# Patient Record
Sex: FEMALE | Race: WHITE | NOT HISPANIC OR LATINO | Employment: OTHER | URBAN - METROPOLITAN AREA
[De-identification: names, ages, dates, MRNs, and addresses within clinical notes are randomized per-mention and may not be internally consistent; named-entity substitution may affect disease eponyms.]

---

## 2023-06-14 ENCOUNTER — HOSPITAL ENCOUNTER (EMERGENCY)
Facility: HOSPITAL | Age: 43
Discharge: HOME | End: 2023-06-14
Attending: EMERGENCY MEDICINE | Admitting: EMERGENCY MEDICINE
Payer: MEDICARE

## 2023-06-14 ENCOUNTER — APPOINTMENT (OUTPATIENT)
Dept: CT IMAGING | Facility: HOSPITAL | Age: 43
End: 2023-06-14
Payer: MEDICARE

## 2023-06-14 VITALS
WEIGHT: 165 LBS | HEART RATE: 60 BPM | DIASTOLIC BLOOD PRESSURE: 74 MMHG | SYSTOLIC BLOOD PRESSURE: 137 MMHG | OXYGEN SATURATION: 98 % | TEMPERATURE: 98.3 F | BODY MASS INDEX: 30.36 KG/M2 | RESPIRATION RATE: 20 BRPM | HEIGHT: 62 IN

## 2023-06-14 DIAGNOSIS — N39.0 ACUTE URINARY TRACT INFECTION: ICD-10-CM

## 2023-06-14 DIAGNOSIS — K92.1 BLACK STOOL: ICD-10-CM

## 2023-06-14 DIAGNOSIS — R10.9 ACUTE ABDOMINAL PAIN: Primary | ICD-10-CM

## 2023-06-14 LAB
ABO GROUP BLD: NORMAL
ABO GROUP BLD: NORMAL
ALBUMIN SERPL-MCNC: 4.1 G/DL (ref 3.5–5.2)
ALBUMIN/GLOB SERPL: 1.4 G/DL
ALP SERPL-CCNC: 102 U/L (ref 39–117)
ALT SERPL W P-5'-P-CCNC: 18 U/L (ref 1–33)
ANION GAP SERPL CALCULATED.3IONS-SCNC: 8 MMOL/L (ref 5–15)
AST SERPL-CCNC: 21 U/L (ref 1–32)
B-HCG UR QL: NEGATIVE
BACTERIA UR QL AUTO: ABNORMAL /HPF
BASOPHILS # BLD AUTO: 0.03 10*3/MM3 (ref 0–0.2)
BASOPHILS NFR BLD AUTO: 0.4 % (ref 0–1.5)
BILIRUB SERPL-MCNC: 0.3 MG/DL (ref 0–1.2)
BILIRUB UR QL STRIP: NEGATIVE
BLD GP AB SCN SERPL QL: NEGATIVE
BUN SERPL-MCNC: 13 MG/DL (ref 6–20)
BUN/CREAT SERPL: 23.6 (ref 7–25)
CALCIUM SPEC-SCNC: 9 MG/DL (ref 8.6–10.5)
CHLORIDE SERPL-SCNC: 108 MMOL/L (ref 98–107)
CLARITY UR: ABNORMAL
CO2 SERPL-SCNC: 25 MMOL/L (ref 22–29)
COLOR UR: YELLOW
CREAT SERPL-MCNC: 0.55 MG/DL (ref 0.57–1)
DEPRECATED RDW RBC AUTO: 36.7 FL (ref 37–54)
DEVELOPER EXPIRATION DATE: NORMAL
DEVELOPER LOT NUMBER: NORMAL
EGFRCR SERPLBLD CKD-EPI 2021: 115.4 ML/MIN/1.73
EOSINOPHIL # BLD AUTO: 0.13 10*3/MM3 (ref 0–0.4)
EOSINOPHIL NFR BLD AUTO: 1.7 % (ref 0.3–6.2)
ERYTHROCYTE [DISTWIDTH] IN BLOOD BY AUTOMATED COUNT: 12.1 % (ref 12.3–15.4)
EXPIRATION DATE: NORMAL
EXPIRATION DATE: NORMAL
FECAL OCCULT BLOOD SCREEN, POC: NEGATIVE
GLOBULIN UR ELPH-MCNC: 2.9 GM/DL
GLUCOSE SERPL-MCNC: 93 MG/DL (ref 65–99)
GLUCOSE UR STRIP-MCNC: NEGATIVE MG/DL
HCT VFR BLD AUTO: 37.6 % (ref 34–46.6)
HGB BLD-MCNC: 11.9 G/DL (ref 12–15.9)
HGB UR QL STRIP.AUTO: ABNORMAL
HOLD SPECIMEN: NORMAL
HYALINE CASTS UR QL AUTO: ABNORMAL /LPF
IMM GRANULOCYTES # BLD AUTO: 0.02 10*3/MM3 (ref 0–0.05)
IMM GRANULOCYTES NFR BLD AUTO: 0.3 % (ref 0–0.5)
INTERNAL NEGATIVE CONTROL: NEGATIVE
INTERNAL POSITIVE CONTROL: POSITIVE
KETONES UR QL STRIP: ABNORMAL
LEUKOCYTE ESTERASE UR QL STRIP.AUTO: NEGATIVE
LYMPHOCYTES # BLD AUTO: 2.22 10*3/MM3 (ref 0.7–3.1)
LYMPHOCYTES NFR BLD AUTO: 29.6 % (ref 19.6–45.3)
Lab: NORMAL
Lab: NORMAL
MCH RBC QN AUTO: 26.2 PG (ref 26.6–33)
MCHC RBC AUTO-ENTMCNC: 31.6 G/DL (ref 31.5–35.7)
MCV RBC AUTO: 82.8 FL (ref 79–97)
MONOCYTES # BLD AUTO: 0.39 10*3/MM3 (ref 0.1–0.9)
MONOCYTES NFR BLD AUTO: 5.2 % (ref 5–12)
MUCOUS THREADS URNS QL MICRO: ABNORMAL /HPF
NEGATIVE CONTROL: NEGATIVE
NEUTROPHILS NFR BLD AUTO: 4.72 10*3/MM3 (ref 1.7–7)
NEUTROPHILS NFR BLD AUTO: 62.8 % (ref 42.7–76)
NITRITE UR QL STRIP: NEGATIVE
NRBC BLD AUTO-RTO: 0 /100 WBC (ref 0–0.2)
PH UR STRIP.AUTO: <=5 [PH] (ref 5–8)
PLATELET # BLD AUTO: 322 10*3/MM3 (ref 140–450)
PMV BLD AUTO: 10 FL (ref 6–12)
POSITIVE CONTROL: POSITIVE
POTASSIUM SERPL-SCNC: 3.8 MMOL/L (ref 3.5–5.2)
PROT SERPL-MCNC: 7 G/DL (ref 6–8.5)
PROT UR QL STRIP: NEGATIVE
RBC # BLD AUTO: 4.54 10*6/MM3 (ref 3.77–5.28)
RBC # UR STRIP: ABNORMAL /HPF
REF LAB TEST METHOD: ABNORMAL
RH BLD: POSITIVE
RH BLD: POSITIVE
SODIUM SERPL-SCNC: 141 MMOL/L (ref 136–145)
SP GR UR STRIP: 1.03 (ref 1–1.03)
SQUAMOUS #/AREA URNS HPF: ABNORMAL /HPF
T&S EXPIRATION DATE: NORMAL
UROBILINOGEN UR QL STRIP: ABNORMAL
WBC # UR STRIP: ABNORMAL /HPF
WBC NRBC COR # BLD: 7.51 10*3/MM3 (ref 3.4–10.8)
WHOLE BLOOD HOLD COAG: NORMAL
WHOLE BLOOD HOLD SPECIMEN: NORMAL

## 2023-06-14 PROCEDURE — 86901 BLOOD TYPING SEROLOGIC RH(D): CPT

## 2023-06-14 PROCEDURE — 86850 RBC ANTIBODY SCREEN: CPT | Performed by: EMERGENCY MEDICINE

## 2023-06-14 PROCEDURE — 87186 SC STD MICRODIL/AGAR DIL: CPT | Performed by: EMERGENCY MEDICINE

## 2023-06-14 PROCEDURE — 80053 COMPREHEN METABOLIC PANEL: CPT | Performed by: EMERGENCY MEDICINE

## 2023-06-14 PROCEDURE — 85025 COMPLETE CBC W/AUTO DIFF WBC: CPT | Performed by: EMERGENCY MEDICINE

## 2023-06-14 PROCEDURE — 99283 EMERGENCY DEPT VISIT LOW MDM: CPT

## 2023-06-14 PROCEDURE — 86900 BLOOD TYPING SEROLOGIC ABO: CPT

## 2023-06-14 PROCEDURE — 86901 BLOOD TYPING SEROLOGIC RH(D): CPT | Performed by: EMERGENCY MEDICINE

## 2023-06-14 PROCEDURE — 82270 OCCULT BLOOD FECES: CPT | Performed by: EMERGENCY MEDICINE

## 2023-06-14 PROCEDURE — 86900 BLOOD TYPING SEROLOGIC ABO: CPT | Performed by: EMERGENCY MEDICINE

## 2023-06-14 PROCEDURE — 81001 URINALYSIS AUTO W/SCOPE: CPT | Performed by: EMERGENCY MEDICINE

## 2023-06-14 PROCEDURE — 81025 URINE PREGNANCY TEST: CPT | Performed by: EMERGENCY MEDICINE

## 2023-06-14 PROCEDURE — 74176 CT ABD & PELVIS W/O CONTRAST: CPT

## 2023-06-14 PROCEDURE — 87077 CULTURE AEROBIC IDENTIFY: CPT | Performed by: EMERGENCY MEDICINE

## 2023-06-14 PROCEDURE — 87086 URINE CULTURE/COLONY COUNT: CPT | Performed by: EMERGENCY MEDICINE

## 2023-06-14 RX ORDER — CEFDINIR 300 MG/1
300 CAPSULE ORAL 2 TIMES DAILY
Qty: 14 CAPSULE | Refills: 0 | Status: SHIPPED | OUTPATIENT
Start: 2023-06-14 | End: 2023-06-15 | Stop reason: SDUPTHER

## 2023-06-14 RX ORDER — SODIUM CHLORIDE 0.9 % (FLUSH) 0.9 %
10 SYRINGE (ML) INJECTION AS NEEDED
Status: DISCONTINUED | OUTPATIENT
Start: 2023-06-14 | End: 2023-06-14 | Stop reason: HOSPADM

## 2023-06-14 RX ORDER — TRAZODONE HYDROCHLORIDE 100 MG/1
100 TABLET ORAL
COMMUNITY
Start: 2023-05-19

## 2023-06-14 RX ORDER — CEFDINIR 300 MG/1
300 CAPSULE ORAL ONCE
Status: COMPLETED | OUTPATIENT
Start: 2023-06-14 | End: 2023-06-14

## 2023-06-14 RX ORDER — TIZANIDINE 2 MG/1
2 TABLET ORAL EVERY 12 HOURS PRN
COMMUNITY
Start: 2023-06-06

## 2023-06-14 RX ORDER — FLUTICASONE PROPIONATE AND SALMETEROL 50; 250 UG/1; UG/1
1 POWDER RESPIRATORY (INHALATION)
COMMUNITY
Start: 2023-05-19

## 2023-06-14 RX ORDER — CETIRIZINE HYDROCHLORIDE 10 MG/1
10 TABLET ORAL DAILY
COMMUNITY
Start: 2023-05-19

## 2023-06-14 RX ORDER — OMEPRAZOLE 40 MG/1
40 CAPSULE, DELAYED RELEASE ORAL DAILY
Qty: 30 CAPSULE | Refills: 0 | Status: SHIPPED | OUTPATIENT
Start: 2023-06-14 | End: 2023-06-15 | Stop reason: SDUPTHER

## 2023-06-14 RX ORDER — MULTIPLE VITAMINS W/ MINERALS TAB 9MG-400MCG
1 TAB ORAL DAILY
COMMUNITY

## 2023-06-14 RX ORDER — IBUPROFEN 400 MG/1
400 TABLET ORAL EVERY 6 HOURS PRN
COMMUNITY
Start: 2023-03-09

## 2023-06-14 RX ORDER — AZELASTINE 1 MG/ML
2 SPRAY, METERED NASAL 2 TIMES DAILY
COMMUNITY
Start: 2023-05-19

## 2023-06-14 RX ADMIN — CEFDINIR 300 MG: 300 CAPSULE ORAL at 21:22

## 2023-06-14 NOTE — Clinical Note
[facility] EMERGENCY DEPARTMENT  [address] [facility] [address]  Phone: [phone]    Mallorie Dover was seen and treated in our emergency department on 6/14/2023.  She may return to work on 06/16/2023.         Thank you for choosing [facility].    Marley Ramirez MD

## 2023-06-14 NOTE — ED PROVIDER NOTES
Subjective   History of Present Illness  43-year-old female who presents for evaluation of abdominal pain with associated black stools and right flank pain.  The patient reports that she has noticed pain throughout her anterior lower abdomen constant now for the last 3 weeks.  She describes it as crampy in nature.  She also reports some right flank pain.  She denies dysuria but does report some frequency.  She has been taking some Azo without relief of the symptoms.  Previous abdominal surgeries include gastric bypass in the distant past.  She has continued to pass gas.  She denies chest pain, cough, shortness of breath.  She denies fever or other infectious symptoms.  She denies any sick contacts.  No new medications.  She does take pain medication on a regular basis and does not desire any pain medication at this time.    Review of Systems   Constitutional:  Negative for chills, fatigue and fever.   HENT:  Negative for congestion, ear pain, postnasal drip, sinus pressure and sore throat.    Eyes:  Negative for pain, redness and visual disturbance.   Respiratory:  Negative for cough, chest tightness and shortness of breath.    Cardiovascular:  Negative for chest pain, palpitations and leg swelling.   Gastrointestinal:  Positive for abdominal pain and blood in stool. Negative for anal bleeding, diarrhea, nausea and vomiting.   Endocrine: Negative for polydipsia and polyuria.   Genitourinary:  Positive for flank pain and frequency. Negative for difficulty urinating, dysuria and urgency.   Musculoskeletal:  Negative for arthralgias, back pain and neck pain.   Skin:  Negative for pallor and rash.   Allergic/Immunologic: Negative for environmental allergies and immunocompromised state.   Neurological:  Negative for dizziness, weakness and headaches.   Hematological:  Negative for adenopathy.   Psychiatric/Behavioral:  Negative for confusion, self-injury and suicidal ideas. The patient is not nervous/anxious.    All  other systems reviewed and are negative.    History reviewed. No pertinent past medical history.    Allergies   Allergen Reactions   • Seroquel [Quetiapine] Swelling   • Gabapentin Rash       History reviewed. No pertinent surgical history.    History reviewed. No pertinent family history.    Social History     Socioeconomic History   • Marital status:            Objective   Physical Exam  Vitals and nursing note reviewed.   Constitutional:       General: She is not in acute distress.     Appearance: Normal appearance. She is well-developed. She is not toxic-appearing or diaphoretic.   HENT:      Head: Normocephalic and atraumatic.      Right Ear: External ear normal.      Left Ear: External ear normal.      Nose: Nose normal.   Eyes:      General: Lids are normal.      Pupils: Pupils are equal, round, and reactive to light.   Neck:      Trachea: No tracheal deviation.   Cardiovascular:      Rate and Rhythm: Normal rate and regular rhythm.      Pulses: No decreased pulses.      Heart sounds: Normal heart sounds. No murmur heard.    No friction rub. No gallop.   Pulmonary:      Effort: Pulmonary effort is normal. No respiratory distress.      Breath sounds: Normal breath sounds. No decreased breath sounds, wheezing, rhonchi or rales.   Abdominal:      General: Bowel sounds are normal.      Palpations: Abdomen is soft.      Tenderness: There is no abdominal tenderness in the suprapubic area. There is right CVA tenderness. There is no guarding or rebound.   Musculoskeletal:         General: No deformity. Normal range of motion.      Cervical back: Normal range of motion and neck supple.   Lymphadenopathy:      Cervical: No cervical adenopathy.   Skin:     General: Skin is warm and dry.      Findings: No rash.   Neurological:      Mental Status: She is alert and oriented to person, place, and time.      Cranial Nerves: No cranial nerve deficit.      Sensory: No sensory deficit.   Psychiatric:         Mood and  Affect: Mood is anxious.         Speech: Speech normal.         Behavior: Behavior normal.         Thought Content: Thought content normal.         Judgment: Judgment normal.       Procedures           ED Course                                           Medical Decision Making  Differential diagnosis includes diverticulitis, urinary tract infection, kidney stone, bowel spasm, bowel obstruction, constipation, dehydration, renal insufficiency.    Kidney function is normal and electrolytes are normal.    White blood cell count is normal.    Urine is negative for leuk esterase and nitrite but does show 4+ bacteria and also shows a significant number of squamous epithelial cells.  However the patient does complain of suprapubic abdominal pain that radiates to the flank and therefore treatment for urinary tract infection with a course of antibiotics is felt to be appropriate.    The patient's stool was negative for blood and she reports that she has been taking Pepto-Bismol which may be accounting for the black appearing stools.  The patient's H&H is normal.  The patient's vital signs have remained normal and stable and the patient is well-appearing nontoxic and in no acute distress.    CT scan of the abdomen pelvis shows no acute abnormalities.    Acute abdominal pain: acute illness or injury  Acute urinary tract infection: acute illness or injury  Black stool: acute illness or injury  Amount and/or Complexity of Data Reviewed  External Data Reviewed: labs, radiology, ECG and notes.  Labs: ordered. Decision-making details documented in ED Course.  Radiology: ordered and independent interpretation performed. Decision-making details documented in ED Course.      Risk  Prescription drug management.          Final diagnoses:   Acute abdominal pain   Acute urinary tract infection   Black stool       ED Disposition  ED Disposition     ED Disposition   Discharge    Condition   Stable    Comment   --             PATIENT  [facility] [address]  [phone]  Schedule an appointment as soon as possible for a visit            Medication List      New Prescriptions    cefdinir 300 MG capsule  Commonly known as: OMNICEF  Take 1 capsule by mouth 2 (Two) Times a Day for 7 days.     omeprazole 40 MG capsule  Commonly known as: priLOSEC  Take 1 capsule by mouth Daily for 30 days.           Where to Get Your Medications      You can get these medications from any pharmacy    Bring a paper prescription for each of these medications  · cefdinir 300 MG capsule  · omeprazole 40 MG capsule            Marley Ramirez MD  06/14/23 8401

## 2023-06-15 RX ORDER — OMEPRAZOLE 40 MG/1
40 CAPSULE, DELAYED RELEASE ORAL DAILY
Qty: 30 CAPSULE | Refills: 0 | Status: SHIPPED | OUTPATIENT
Start: 2023-06-15 | End: 2023-07-15

## 2023-06-15 RX ORDER — CEFDINIR 300 MG/1
300 CAPSULE ORAL 2 TIMES DAILY
Qty: 14 CAPSULE | Refills: 0 | Status: SHIPPED | OUTPATIENT
Start: 2023-06-15 | End: 2023-06-22

## 2023-06-15 NOTE — DISCHARGE INSTRUCTIONS
Make sure to drink plenty of fluids.    Take antibiotics as prescribed.    Take omeprazole as prescribed.    Follow-up with primary care physician for outpatient evaluation.

## 2023-06-16 LAB — BACTERIA SPEC AEROBE CULT: ABNORMAL

## 2023-11-20 ENCOUNTER — LAB (OUTPATIENT)
Dept: LAB | Facility: HOSPITAL | Age: 43
End: 2023-11-20
Payer: MEDICARE

## 2023-11-20 ENCOUNTER — OFFICE VISIT (OUTPATIENT)
Dept: FAMILY MEDICINE CLINIC | Facility: CLINIC | Age: 43
End: 2023-11-20
Payer: MEDICARE

## 2023-11-20 VITALS
WEIGHT: 178 LBS | SYSTOLIC BLOOD PRESSURE: 132 MMHG | OXYGEN SATURATION: 99 % | HEART RATE: 64 BPM | BODY MASS INDEX: 32.76 KG/M2 | HEIGHT: 62 IN | DIASTOLIC BLOOD PRESSURE: 72 MMHG

## 2023-11-20 DIAGNOSIS — C73: ICD-10-CM

## 2023-11-20 DIAGNOSIS — G56.03 BILATERAL CARPAL TUNNEL SYNDROME: ICD-10-CM

## 2023-11-20 DIAGNOSIS — D49.7 NEOPLASM OF THYROID: ICD-10-CM

## 2023-11-20 DIAGNOSIS — Z12.11 SCREENING FOR COLON CANCER: ICD-10-CM

## 2023-11-20 DIAGNOSIS — Z00.00 ENCOUNTER FOR ROUTINE HISTORY AND PHYSICAL EXAM IN FEMALE: ICD-10-CM

## 2023-11-20 DIAGNOSIS — Z86.39 HISTORY OF DIABETES MELLITUS: ICD-10-CM

## 2023-11-20 DIAGNOSIS — Z98.84 HISTORY OF GASTRIC BYPASS: ICD-10-CM

## 2023-11-20 DIAGNOSIS — F51.01 PRIMARY INSOMNIA: ICD-10-CM

## 2023-11-20 DIAGNOSIS — E55.9 VITAMIN D DEFICIENCY: ICD-10-CM

## 2023-11-20 DIAGNOSIS — I10 PRIMARY HYPERTENSION: Primary | ICD-10-CM

## 2023-11-20 DIAGNOSIS — Z11.59 ENCOUNTER FOR HEPATITIS C SCREENING TEST FOR LOW RISK PATIENT: ICD-10-CM

## 2023-11-20 DIAGNOSIS — R92.8 ABNORMAL FINDING ON MAMMOGRAPHY: ICD-10-CM

## 2023-11-20 DIAGNOSIS — E78.5 DYSLIPIDEMIA: ICD-10-CM

## 2023-11-20 DIAGNOSIS — Z12.31 ENCOUNTER FOR SCREENING MAMMOGRAM FOR MALIGNANT NEOPLASM OF BREAST: ICD-10-CM

## 2023-11-20 DIAGNOSIS — Z85.850 HISTORY OF THYROID CANCER: ICD-10-CM

## 2023-11-20 DIAGNOSIS — E66.01 MORBID OBESITY: ICD-10-CM

## 2023-11-20 DIAGNOSIS — E03.9 HYPOTHYROIDISM, UNSPECIFIED TYPE: ICD-10-CM

## 2023-11-20 DIAGNOSIS — I10 ESSENTIAL HYPERTENSION: ICD-10-CM

## 2023-11-20 DIAGNOSIS — J45.20 MILD INTERMITTENT ASTHMA WITHOUT COMPLICATION: ICD-10-CM

## 2023-11-20 DIAGNOSIS — D50.9 MICROCYTIC ANEMIA: ICD-10-CM

## 2023-11-20 PROBLEM — E04.1 THYROID NODULE: Status: ACTIVE | Noted: 2023-11-20

## 2023-11-20 PROBLEM — G47.00 INSOMNIA: Status: ACTIVE | Noted: 2023-11-20

## 2023-11-20 PROBLEM — F43.10 POSTTRAUMATIC STRESS DISORDER: Status: ACTIVE | Noted: 2022-03-01

## 2023-11-20 PROBLEM — R53.83 FATIGUE: Status: ACTIVE | Noted: 2022-03-02

## 2023-11-20 PROBLEM — E28.2 POLYCYSTIC OVARIES: Status: ACTIVE | Noted: 2023-11-20

## 2023-11-20 PROBLEM — E66.811 CLASS 1 OBESITY DUE TO EXCESS CALORIES WITH SERIOUS COMORBIDITY AND BODY MASS INDEX (BMI) OF 32.0 TO 32.9 IN ADULT: Status: ACTIVE | Noted: 2023-11-20

## 2023-11-20 PROBLEM — Z87.39 HISTORY OF BACK PAIN: Status: ACTIVE | Noted: 2022-03-02

## 2023-11-20 PROBLEM — R06.00 DYSPNEA: Status: ACTIVE | Noted: 2023-11-20

## 2023-11-20 PROBLEM — R12 HEARTBURN: Status: ACTIVE | Noted: 2021-01-25

## 2023-11-20 PROBLEM — E66.09 CLASS 1 OBESITY DUE TO EXCESS CALORIES WITH SERIOUS COMORBIDITY AND BODY MASS INDEX (BMI) OF 32.0 TO 32.9 IN ADULT: Status: ACTIVE | Noted: 2023-11-20

## 2023-11-20 PROBLEM — Z79.4 ENCOUNTER FOR LONG-TERM (CURRENT) USE OF INSULIN: Status: ACTIVE | Noted: 2022-03-10

## 2023-11-20 PROBLEM — F17.200 TOBACCO DEPENDENCE SYNDROME: Status: ACTIVE | Noted: 2023-11-20

## 2023-11-20 PROBLEM — G62.9 NEUROPATHY: Status: ACTIVE | Noted: 2023-11-20

## 2023-11-20 PROBLEM — Z86.59 HISTORY OF DEPRESSION: Status: ACTIVE | Noted: 2022-03-02

## 2023-11-20 PROCEDURE — 36415 COLL VENOUS BLD VENIPUNCTURE: CPT | Performed by: INTERNAL MEDICINE

## 2023-11-20 PROCEDURE — 82746 ASSAY OF FOLIC ACID SERUM: CPT | Performed by: INTERNAL MEDICINE

## 2023-11-20 PROCEDURE — 86803 HEPATITIS C AB TEST: CPT | Performed by: INTERNAL MEDICINE

## 2023-11-20 PROCEDURE — 84466 ASSAY OF TRANSFERRIN: CPT | Performed by: INTERNAL MEDICINE

## 2023-11-20 PROCEDURE — 82306 VITAMIN D 25 HYDROXY: CPT | Performed by: INTERNAL MEDICINE

## 2023-11-20 PROCEDURE — 83540 ASSAY OF IRON: CPT | Performed by: INTERNAL MEDICINE

## 2023-11-20 PROCEDURE — 85027 COMPLETE CBC AUTOMATED: CPT | Performed by: INTERNAL MEDICINE

## 2023-11-20 PROCEDURE — 84443 ASSAY THYROID STIM HORMONE: CPT | Performed by: INTERNAL MEDICINE

## 2023-11-20 PROCEDURE — 82043 UR ALBUMIN QUANTITATIVE: CPT | Performed by: INTERNAL MEDICINE

## 2023-11-20 PROCEDURE — 83036 HEMOGLOBIN GLYCOSYLATED A1C: CPT | Performed by: INTERNAL MEDICINE

## 2023-11-20 PROCEDURE — 82607 VITAMIN B-12: CPT | Performed by: INTERNAL MEDICINE

## 2023-11-20 PROCEDURE — 80053 COMPREHEN METABOLIC PANEL: CPT | Performed by: INTERNAL MEDICINE

## 2023-11-20 PROCEDURE — 80061 LIPID PANEL: CPT | Performed by: INTERNAL MEDICINE

## 2023-11-20 RX ORDER — TIZANIDINE 2 MG/1
2 TABLET ORAL EVERY 12 HOURS PRN
Qty: 60 TABLET | Refills: 2 | Status: SHIPPED | OUTPATIENT
Start: 2023-11-20

## 2023-11-20 RX ORDER — FLUTICASONE PROPIONATE AND SALMETEROL 50; 250 UG/1; UG/1
1 POWDER RESPIRATORY (INHALATION)
Qty: 1 EACH | Refills: 9 | Status: SHIPPED | OUTPATIENT
Start: 2023-11-20

## 2023-11-20 RX ORDER — TRAZODONE HYDROCHLORIDE 100 MG/1
100 TABLET ORAL NIGHTLY PRN
Qty: 30 TABLET | Refills: 2 | Status: SHIPPED | OUTPATIENT
Start: 2023-11-20

## 2023-11-20 RX ORDER — IBUPROFEN 800 MG/1
800 TABLET ORAL EVERY 12 HOURS
Qty: 60 TABLET | Refills: 9 | Status: SHIPPED | COMMUNITY
Start: 2023-11-20

## 2023-11-20 NOTE — PROGRESS NOTES
"Mallorie Dover  1980  [id number]  Patient Care Team:  Doug Worrell MD as PCP - General (Internal Medicine)    Mallorie Dover is a 43 y.o. female here today to establish care.  This patient is accompanied by their self who contributes to the history of their care.    Chief Complaint:    Chief Complaint   Patient presents with    Hip Pain        History of Present Illness:     She is here to establish care. She has really no concerns today. . She has a [facility] gastric bypass surgery. Weigh down from 390lb. Previously was treated for HTN and DM prior to gastric bypass. Off mediations x 2 years. Previously on metformin and jardiance. Of 2 years.   Previously treated for thyroid cancer ( off thyroid meds x 2 years). Was supposed to be followed by endocrinology.     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  She feels she may need a carpal tunnel release. She experiences bi;ateral hand numbness- she hascopies og emg/ncv and told she may need surgery. She uses braces which stopped working. She notes she drops things which is worse. Sx progressive for years.     He is on stable dose of Advair and albuterol for \"COPD: She relatively rarely uses albuterol.  Is in need of a refill today.    Treated for ptsd and on disability- on no medications- no psychiatrist.   She needs gyneocology, mamogram past due  She has not had colon cancer screening- mother had colon cancer.  Denies any abdominal pain blood in her stool or change in her bowel habits.  She apparently has had \"metallic markers implanted in her breast secondary to abnormal mammogram.  She had left relationship and moved to [place] without following up for the mammogram.  She is requesting a referral for mammogram as she gets established here.    Past Medical History:   Diagnosis Date    Arthritis     Asthma     Cancer     GERD (gastroesophageal reflux disease)     Hypothyroidism     Spondylosis  " "      Past Surgical History:   Procedure Laterality Date    GASTRIC BYPASS OPEN          Family History   Problem Relation Age of Onset    Mental illness Mother     Hyperlipidemia Mother     Hypertension Mother     Heart disease Mother     Diabetes Mother     Cancer Mother     Arthritis Mother     Migraines Mother     Tuberculosis Mother        Social History     Socioeconomic History    Marital status:    Tobacco Use    Smoking status: Former     Packs/day: 1.00     Years: 15.00     Additional pack years: 0.00     Total pack years: 15.00     Types: Cigarettes     Start date: 1/1/1985     Quit date: 9/1/2020     Years since quitting: 3.2    Smokeless tobacco: Never   Vaping Use    Vaping Use: Never used   Substance and Sexual Activity    Alcohol use: Not Currently    Drug use: Never    Sexual activity: Defer       Allergies   Allergen Reactions    Seroquel [Quetiapine] Swelling    Gabapentin Rash       Review of Systems:    Review of Systems   Constitutional:  Positive for fatigue.   Eyes: Negative.    Genitourinary:  Positive for breast pain.   Musculoskeletal:  Positive for back pain.   Skin: Negative.    Neurological:  Positive for numbness.   Psychiatric/Behavioral:  Positive for sleep disturbance. Negative for dysphoric mood and depressed mood.        Vitals:    11/20/23 1454   BP: 132/72   Pulse: 64   SpO2: 99%   Weight: 80.7 kg (178 lb)   Height: 157.5 cm (62.01\")     Body mass index is 32.55 kg/m².      Current Outpatient Medications:     Acetaminophen 500 MG capsule, Take  by mouth., Disp: , Rfl:     Advair Diskus 250-50 MCG/ACT DISKUS, Inhale 1 puff 2 (Two) Times a Day., Disp: 1 each, Rfl: 9    azelastine (ASTELIN) 0.1 % nasal spray, 2 sprays into the nostril(s) as directed by provider 2 (Two) Times a Day., Disp: , Rfl:     cetirizine (zyrTEC) 10 MG tablet, Take 1 tablet by mouth Daily., Disp: , Rfl:     ibuprofen (ADVIL,MOTRIN) 800 MG tablet, Take 1 tablet by mouth Every 12 (Twelve) Hours., Disp: " 60 tablet, Rfl: 9    multivitamin with minerals (MULTI FOR HER PO), Take 1 tablet by mouth Daily., Disp: , Rfl:     tiZANidine (ZANAFLEX) 2 MG tablet, Take 1 tablet by mouth Every 12 (Twelve) Hours As Needed for Muscle Spasms., Disp: 60 tablet, Rfl: 2    traZODone (DESYREL) 100 MG tablet, Take 1 tablet by mouth At Night As Needed for Sleep (night tremors)., Disp: 30 tablet, Rfl: 2    Physical Exam:    Physical Exam  Vitals and nursing note reviewed.   Constitutional:       General: She is not in acute distress.     Appearance: She is well-developed. She is obese. She is not diaphoretic.   HENT:      Head: Normocephalic and atraumatic.      Right Ear: External ear normal.      Left Ear: External ear normal.      Mouth/Throat:      Pharynx: No oropharyngeal exudate.   Eyes:      General: No scleral icterus.        Right eye: No discharge.         Left eye: No discharge.      Extraocular Movements: Extraocular movements intact.      Conjunctiva/sclera: Conjunctivae normal.   Neck:      Thyroid: No thyromegaly.      Vascular: No JVD.      Trachea: No tracheal deviation.   Cardiovascular:      Rate and Rhythm: Normal rate and regular rhythm.      Heart sounds: Normal heart sounds.      Comments: PMI nondisplaced  Pulmonary:      Effort: Pulmonary effort is normal.      Breath sounds: Normal breath sounds. No wheezing or rales.   Abdominal:      General: Bowel sounds are normal.      Palpations: Abdomen is soft.      Tenderness: There is no abdominal tenderness. There is no guarding or rebound.   Musculoskeletal:      Cervical back: Normal range of motion and neck supple.      Right lower leg: No edema.      Left lower leg: No edema.   Lymphadenopathy:      Cervical: No cervical adenopathy.   Skin:     General: Skin is warm and dry.      Capillary Refill: Capillary refill takes less than 2 seconds.      Coloration: Skin is not pale.      Findings: No rash.   Neurological:      Mental Status: She is alert and oriented to  person, place, and time.      Motor: No abnormal muscle tone.      Coordination: Coordination normal.      Comments: Positive Tinel's sign   Psychiatric:         Judgment: Judgment normal.         Procedures    Results Review:    None    Assessment/Plan:    Problem List Items Addressed This Visit          Cardiac and Vasculature    Essential hypertension    RESOLVED: Hypertensive disorder - Primary    Relevant Orders    Comprehensive Metabolic Panel    TSH Rfx On Abnormal To Free T4    CBC (No Diff)       Endocrine and Metabolic    Vitamin D deficiency    Relevant Orders    Vitamin D 25 hydroxy    RESOLVED: Morbid obesity       Genitourinary and Reproductive     Abnormal finding on mammography       Hematology and Neoplasia    Mixed papillary and follicular carcinoma    Neoplasm of thyroid       Neuro    Bilateral carpal tunnel syndrome    Relevant Orders    Ambulatory Referral to Hand Surgery       Pulmonary and Pneumonias    Mild intermittent asthma without complication    Current Assessment & Plan     Asthma is improving with treatment.  The patient is experiencing no daytime asthma symptoms. She is experiencing no nighttime asthma symptoms.  Continue Advair as needed albuterol.             Relevant Medications    Advair Diskus 250-50 MCG/ACT DISKUS       Sleep    Insomnia     Other Visit Diagnoses       History of thyroid cancer        Relevant Orders    Ambulatory Referral to Endocrinology    TSH Rfx On Abnormal To Free T4    Encounter for screening mammogram for malignant neoplasm of breast        Relevant Orders    Mammo Screening Digital Tomosynthesis Bilateral With CAD    Screening for colon cancer        Relevant Orders    Ambulatory Referral For Screening Colonoscopy    Ambulatory Referral to Gastroenterology    Encounter for routine history and physical exam in female        Relevant Orders    Ambulatory Referral to Gynecology    Hypothyroidism, unspecified type        Encounter for hepatitis C screening  test for low risk patient        Relevant Orders    Hepatitis C Antibody    History of diabetes mellitus        Relevant Orders    Lipid Panel    Hemoglobin A1c    MicroAlbumin, Urine, Random - Urine, Clean Catch    Dyslipidemia        Relevant Orders    Comprehensive Metabolic Panel    Lipid Panel    History of gastric bypass        Relevant Orders    Vitamin D 25 hydroxy    Vitamin B12    Folate            Plan of care reviewed with patient at the conclusion of today's visit. Education was provided regarding diagnosis and management.  Patient verbalizes understanding of and agreement with management plan.    Return in about 3 months (around 2/20/2024) for Medicare Wellness.    Doug Worrell MD      Please note than portions of this note were completed wth a Voice Recognition Program

## 2023-11-20 NOTE — ASSESSMENT & PLAN NOTE
Asthma is improving with treatment.  The patient is experiencing no daytime asthma symptoms. She is experiencing no nighttime asthma symptoms.  Continue Advair as needed albuterol.

## 2023-11-21 LAB
25(OH)D3 SERPL-MCNC: 33.4 NG/ML (ref 30–100)
ALBUMIN SERPL-MCNC: 4.7 G/DL (ref 3.5–5.2)
ALBUMIN UR-MCNC: <1.2 MG/DL
ALBUMIN/GLOB SERPL: 1.7 G/DL
ALP SERPL-CCNC: 92 U/L (ref 39–117)
ALT SERPL W P-5'-P-CCNC: 29 U/L (ref 1–33)
ANION GAP SERPL CALCULATED.3IONS-SCNC: 9 MMOL/L (ref 5–15)
AST SERPL-CCNC: 22 U/L (ref 1–32)
BILIRUB SERPL-MCNC: 0.2 MG/DL (ref 0–1.2)
BUN SERPL-MCNC: 12 MG/DL (ref 6–20)
BUN/CREAT SERPL: 16 (ref 7–25)
CALCIUM SPEC-SCNC: 9.4 MG/DL (ref 8.6–10.5)
CHLORIDE SERPL-SCNC: 105 MMOL/L (ref 98–107)
CHOLEST SERPL-MCNC: 176 MG/DL (ref 0–200)
CO2 SERPL-SCNC: 24 MMOL/L (ref 22–29)
CREAT SERPL-MCNC: 0.75 MG/DL (ref 0.57–1)
DEPRECATED RDW RBC AUTO: 39.7 FL (ref 37–54)
EGFRCR SERPLBLD CKD-EPI 2021: 100.2 ML/MIN/1.73
ERYTHROCYTE [DISTWIDTH] IN BLOOD BY AUTOMATED COUNT: 14.4 % (ref 12.3–15.4)
FOLATE SERPL-MCNC: >20 NG/ML (ref 4.78–24.2)
GLOBULIN UR ELPH-MCNC: 2.7 GM/DL
GLUCOSE SERPL-MCNC: 81 MG/DL (ref 65–99)
HBA1C MFR BLD: 5.7 % (ref 4.8–5.6)
HCT VFR BLD AUTO: 37.8 % (ref 34–46.6)
HCV AB SER DONR QL: NORMAL
HDLC SERPL-MCNC: 52 MG/DL (ref 40–60)
HGB BLD-MCNC: 12 G/DL (ref 12–15.9)
LDLC SERPL CALC-MCNC: 99 MG/DL (ref 0–100)
LDLC/HDLC SERPL: 1.84 {RATIO}
MCH RBC QN AUTO: 24.5 PG (ref 26.6–33)
MCHC RBC AUTO-ENTMCNC: 31.7 G/DL (ref 31.5–35.7)
MCV RBC AUTO: 77.1 FL (ref 79–97)
PLATELET # BLD AUTO: 370 10*3/MM3 (ref 140–450)
PMV BLD AUTO: 10.5 FL (ref 6–12)
POTASSIUM SERPL-SCNC: 3.7 MMOL/L (ref 3.5–5.2)
PROT SERPL-MCNC: 7.4 G/DL (ref 6–8.5)
RBC # BLD AUTO: 4.9 10*6/MM3 (ref 3.77–5.28)
SODIUM SERPL-SCNC: 138 MMOL/L (ref 136–145)
TRIGL SERPL-MCNC: 142 MG/DL (ref 0–150)
TSH SERPL DL<=0.05 MIU/L-ACNC: 1.2 UIU/ML (ref 0.27–4.2)
VIT B12 BLD-MCNC: 517 PG/ML (ref 211–946)
VLDLC SERPL-MCNC: 25 MG/DL (ref 5–40)
WBC NRBC COR # BLD AUTO: 8.39 10*3/MM3 (ref 3.4–10.8)

## 2023-11-22 DIAGNOSIS — D50.9 MICROCYTIC ANEMIA: Primary | ICD-10-CM

## 2023-11-22 LAB
IRON 24H UR-MRATE: 58 MCG/DL (ref 37–145)
IRON SATN MFR SERPL: 12 % (ref 20–50)
TIBC SERPL-MCNC: 501 MCG/DL (ref 298–536)
TRANSFERRIN SERPL-MCNC: 336 MG/DL (ref 200–360)

## 2023-11-27 ENCOUNTER — TELEPHONE (OUTPATIENT)
Dept: FAMILY MEDICINE CLINIC | Facility: CLINIC | Age: 43
End: 2023-11-27
Payer: MEDICARE

## 2023-11-27 NOTE — TELEPHONE ENCOUNTER
----- Message from Doug Worrell MD sent at 11/24/2023  9:22 AM EST -----  Overall labs look good. Remains free of diabetes, lipids controlled. Only concern is mild decreased iron, likely related to her gastric bypass. She is NOT anemiic ( yet) but needs to add iron  [id number] mg daily OTC to  prevent developing anemia. Thanks. Recheck iton in 6 mon with vitamin d.       Attempted to contact patient, no answer. LVM with office # given.     Please relay message and document.

## 2023-11-28 NOTE — TELEPHONE ENCOUNTER
Name: Mallorie Dover    Relationship: Self    Best Callback Number: [phone]     HUB PROVIDED THE RELAY MESSAGE FROM THE OFFICE   PATIENT VOICED UNDERSTANDING AND HAS NO FURTHER QUESTIONS AT THIS TIME    ADDITIONAL INFORMATION:  CAN DR WHITAKER SEND IN A PRESCRIPTION FOR THE IRON SULFATE TO [facility]?

## 2023-11-28 NOTE — TELEPHONE ENCOUNTER
Caller: StanislawYuniorMalloriefelicitas Lombardi    Relationship: Self    Best call back number: [phone]     Requested Prescriptions:   Requested Prescriptions     Pending Prescriptions Disp Refills    ibuprofen (ADVIL,MOTRIN) 800 MG tablet 60 tablet 9     Sig: Take 1 tablet by mouth Every 12 (Twelve) Hours.    Acetaminophen 500 MG capsule       Sig: Take  by mouth.        Pharmacy where request should be sent: [id number] [place] [address] [address] AT [facility] [address] [facility] [phone] [facility] [phone] FX     Last office visit with prescribing clinician: 11/20/2023   Last telemedicine visit with prescribing clinician: Visit date not found   Next office visit with prescribing clinician: 2/21/2024     Additional details provided by patient:     Does the patient have less than a 3 day supply:  [x] Yes  [] No    Would you like a call back once the refill request has been completed: [x] Yes [] No    If the office needs to give you a call back, can they leave a voicemail: [x] Yes [] No    Selena Herrera Rep   11/28/23 09:18 EST

## 2023-11-28 NOTE — TELEPHONE ENCOUNTER
Left message for Mallorie Dover  to return my call.    Hub may relay message and document    ----- Message from Doug Worrell MD sent at 11/24/2023  9:22 AM EST -----  Overall labs look good. Remains free of diabetes, lipids controlled. Only concern is mild decreased iron, likely related to her gastric bypass. She is NOT anemiic ( yet) but needs to add iron  [id number] mg daily OTC to  prevent developing anemia. Thanks. Recheck adria in 6 mon with vitamin d.

## 2023-11-29 RX ORDER — IBUPROFEN 800 MG/1
800 TABLET ORAL EVERY 12 HOURS
Qty: 60 TABLET | Refills: 9 | Status: SHIPPED | OUTPATIENT
Start: 2023-11-29

## 2023-11-29 NOTE — TELEPHONE ENCOUNTER
Rx Refill Note  Requested Prescriptions     Pending Prescriptions Disp Refills    ibuprofen (ADVIL,MOTRIN) 800 MG tablet 60 tablet 9     Sig: Take 1 tablet by mouth Every 12 (Twelve) Hours.      Last office visit with prescribing clinician: 11/20/2023     Next office visit with prescribing clinician: 2/21/2024       eDbby Alfaro MA  11/29/23, 10:08 EST

## 2023-12-01 RX ORDER — IBUPROFEN 800 MG/1
800 TABLET ORAL EVERY 12 HOURS
Qty: 60 TABLET | Refills: 9 | OUTPATIENT
Start: 2023-12-01

## 2023-12-06 ENCOUNTER — TELEPHONE (OUTPATIENT)
Dept: FAMILY MEDICINE CLINIC | Facility: CLINIC | Age: 43
End: 2023-12-06
Payer: MEDICARE

## 2023-12-06 ENCOUNTER — TELEPHONE (OUTPATIENT)
Dept: FAMILY MEDICINE CLINIC | Facility: CLINIC | Age: 43
End: 2023-12-06

## 2023-12-06 NOTE — TELEPHONE ENCOUNTER
Caller: Mallorie Dover    Relationship: Self    Best call back number: [phone]     What is the best time to reach you: ANY    Who are you requesting to speak with (clinical staff, provider,  specific staff member): DR. WHITAKER    Do you know the name of the person who called: SELF    What was the call regarding: PATIENT NEEDS PROVIDER TO CALL DR. ALFORD TO VERIFY THAT HE KNOWS  SHE HAD A GASTRIC BYPASS BEFORE THEY CAN CONTINUE TO SEE PATIENT FOR HER REFERRAL.    Is it okay if the provider responds through MyChart: NO

## 2023-12-06 NOTE — TELEPHONE ENCOUNTER
Caller: Mallorie Dover    Relationship: Self    Best call back number:  [phone]    Which medication are you concerned about:     COLONOSCOPY PREP    What are your concerns:     PATIENT NEEDS SUPREP WHICH IS HALF A GALLON.  PATIENT HAD GASTRIC BYPASS AND CANNOT HANDLE FULL GALLON

## 2023-12-18 ENCOUNTER — OFFICE VISIT (OUTPATIENT)
Dept: FAMILY MEDICINE CLINIC | Facility: CLINIC | Age: 43
End: 2023-12-18
Payer: MEDICARE

## 2023-12-18 VITALS
WEIGHT: 179.4 LBS | HEIGHT: 62 IN | SYSTOLIC BLOOD PRESSURE: 122 MMHG | DIASTOLIC BLOOD PRESSURE: 76 MMHG | BODY MASS INDEX: 33.01 KG/M2 | HEART RATE: 86 BPM | OXYGEN SATURATION: 99 %

## 2023-12-18 DIAGNOSIS — B02.9 HERPES ZOSTER WITHOUT COMPLICATION: Primary | ICD-10-CM

## 2023-12-18 PROBLEM — E11.9 DIABETES MELLITUS: Chronic | Status: ACTIVE | Noted: 2021-01-24

## 2023-12-18 PROBLEM — J45.909 ASTHMA: Status: ACTIVE | Noted: 2023-12-18

## 2023-12-18 PROBLEM — F31.9 BIPOLAR DISORDER: Status: ACTIVE | Noted: 2023-12-18

## 2023-12-18 PROBLEM — M19.90 ARTHRITIS: Status: ACTIVE | Noted: 2021-01-24

## 2023-12-18 PROBLEM — G56.00 CARPAL TUNNEL SYNDROME: Status: ACTIVE | Noted: 2023-12-18

## 2023-12-18 PROCEDURE — 1159F MED LIST DOCD IN RCRD: CPT | Performed by: FAMILY MEDICINE

## 2023-12-18 PROCEDURE — 3078F DIAST BP <80 MM HG: CPT | Performed by: FAMILY MEDICINE

## 2023-12-18 PROCEDURE — 3044F HG A1C LEVEL LT 7.0%: CPT | Performed by: FAMILY MEDICINE

## 2023-12-18 PROCEDURE — 3074F SYST BP LT 130 MM HG: CPT | Performed by: FAMILY MEDICINE

## 2023-12-18 PROCEDURE — 1160F RVW MEDS BY RX/DR IN RCRD: CPT | Performed by: FAMILY MEDICINE

## 2023-12-18 PROCEDURE — 99213 OFFICE O/P EST LOW 20 MIN: CPT | Performed by: FAMILY MEDICINE

## 2023-12-18 RX ORDER — CYCLOBENZAPRINE HCL 10 MG
1 TABLET ORAL AS NEEDED
COMMUNITY

## 2023-12-18 RX ORDER — FLUTICASONE PROPIONATE 50 MCG
2 SPRAY, SUSPENSION (ML) NASAL DAILY
COMMUNITY

## 2023-12-18 RX ORDER — ALBUTEROL SULFATE 90 UG/1
2 AEROSOL, METERED RESPIRATORY (INHALATION) AS NEEDED
COMMUNITY

## 2023-12-18 RX ORDER — VALACYCLOVIR HYDROCHLORIDE 500 MG/1
1000 TABLET, FILM COATED ORAL 3 TIMES DAILY
Qty: 42 TABLET | Refills: 0 | Status: SHIPPED | OUTPATIENT
Start: 2023-12-18 | End: 2023-12-25

## 2023-12-18 RX ORDER — CALAMINE
LOTION (ML) TOPICAL 3 TIMES DAILY PRN
Qty: 180 ML | Refills: 0 | Status: SHIPPED | OUTPATIENT
Start: 2023-12-18

## 2023-12-18 NOTE — PROGRESS NOTES
Office Note     Name: Mallorie Dover    : 1980     MRN: [id number]     Chief Complaint  Herpes Zoster (Possible shingle. On the right side of her body , from her head to toes and she states that it burns and its very painful. She states that it hurts to the bone)    Subjective     History of Present Illness:  Mallorie Dover is a 43 y.o. female who presents today for evaluation of rash.    Patient reports that 2 days ago she developed a new vesicular rash on her right hip.  Patient states that she has had similar outbreaks before in her right hip region as well as on her right scalp.  She states that she is originally evaluated in the [place] area and diagnosed with eczema.  She states that she applied a topical steroid with minimal relief at that time.  She states that this outbreak came on suddenly and was quite painful, reporting that the pain goes down to the bone.  She states that it is difficult to walk with this pain.  She states that this time she does not feel the rash is spreading.  She did try to put some Neosporin on it yesterday that just irritated the area.  She did have an outbreak earlier this year that self resolved.  She denies any fevers or increased dressers at this time.      Review of Systems:   Review of Systems   Skin:  Positive for rash.   All other systems reviewed and are negative.      Past Medical History:   Past Medical History:   Diagnosis Date    Arthritis     Asthma     Cancer     GERD (gastroesophageal reflux disease)     Hypothyroidism     Spondylosis        Past Surgical History:   Past Surgical History:   Procedure Laterality Date    GASTRIC BYPASS OPEN         Family History:   Family History   Problem Relation Age of Onset    Mental illness Mother     Hyperlipidemia Mother     Hypertension Mother     Heart disease Mother     Diabetes Mother     Cancer Mother     Arthritis Mother     Migraines Mother     Tuberculosis Mother        Social History:    Social History     Socioeconomic History    Marital status:    Tobacco Use    Smoking status: Former     Packs/day: 1.00     Years: 15.00     Additional pack years: 0.00     Total pack years: 15.00     Types: Cigarettes     Start date: 1/1/1985     Quit date: 9/1/2020     Years since quitting: 3.2    Smokeless tobacco: Never   Vaping Use    Vaping Use: Never used   Substance and Sexual Activity    Alcohol use: Not Currently    Drug use: Never    Sexual activity: Defer       Immunizations:   Immunization History   Administered Date(s) Administered    COVID-19 (UNSPECIFIED) 04/03/2021, 05/02/2021, 12/05/2022    COVID-19 F23 (PFIZER) 12YRS+ (COMIRNATY) 10/02/2023    Influenza Injectable Mdck Pf Quad 10/02/2023    Pneumococcal Polysaccharide (PPSV23) 08/17/2020    Tdap 06/09/2014        Medications:     Current Outpatient Medications:     Acetaminophen 500 MG capsule, Take  by mouth., Disp: , Rfl:     Advair Diskus 250-50 MCG/ACT DISKUS, Inhale 1 puff 2 (Two) Times a Day., Disp: 1 each, Rfl: 9    albuterol sulfate  (90 Base) MCG/ACT inhaler, Inhale 2 puffs As Needed., Disp: , Rfl:     azelastine (ASTELIN) 0.1 % nasal spray, 2 sprays into the nostril(s) as directed by provider 2 (Two) Times a Day., Disp: , Rfl:     cetirizine (zyrTEC) 10 MG tablet, Take 1 tablet by mouth Daily., Disp: , Rfl:     cyclobenzaprine (FLEXERIL) 10 MG tablet, Take 1 tablet by mouth As Needed., Disp: , Rfl:     fluticasone (FLONASE) 50 MCG/ACT nasal spray, 2 sprays into the nostril(s) as directed by provider Daily., Disp: , Rfl:     ibuprofen (ADVIL,MOTRIN) 800 MG tablet, Take 1 tablet by mouth Every 12 (Twelve) Hours., Disp: 60 tablet, Rfl: 9    Iron, Ferrous Sulfate, 325 (65 Fe) MG tablet, Take 1 tablet by mouth Daily., Disp: 30 tablet, Rfl: 2    multivitamin with minerals (MULTI FOR HER PO), Take 1 tablet by mouth Daily., Disp: , Rfl:     tiZANidine (ZANAFLEX) 2 MG tablet, Take 1 tablet by mouth Every 12 (Twelve) Hours As  "Needed for Muscle Spasms., Disp: 60 tablet, Rfl: 2    traZODone (DESYREL) 100 MG tablet, Take 1 tablet by mouth At Night As Needed for Sleep (night tremors)., Disp: 30 tablet, Rfl: 2    calamine lotion, Apply  topically to the appropriate area as directed 3 (Three) Times a Day As Needed for Itching (itching)., Disp: 180 mL, Rfl: 0    valACYclovir (Valtrex) 500 MG tablet, Take 2 tablets by mouth 3 (Three) Times a Day for 7 days., Disp: 42 tablet, Rfl: 0    Allergies:   Allergies   Allergen Reactions    Bupropion Hives and Unknown - Low Severity     blisters    Seroquel [Quetiapine] Swelling    Gabapentin Rash       Objective     Vital Signs  /76   Pulse 86   Ht 157.5 cm (62.01\")   Wt 81.4 kg (179 lb 6.4 oz)   SpO2 99%   BMI 32.80 kg/m²   Estimated body mass index is 32.8 kg/m² as calculated from the following:    Height as of this encounter: 157.5 cm (62.01\").    Weight as of this encounter: 81.4 kg (179 lb 6.4 oz).         Physical Exam  Vitals and nursing note reviewed.   Constitutional:       Appearance: Normal appearance. She is normal weight.   HENT:      Head: Normocephalic and atraumatic.      Nose: Nose normal.      Mouth/Throat:      Mouth: Mucous membranes are moist.   Eyes:      Extraocular Movements: Extraocular movements intact.      Pupils: Pupils are equal, round, and reactive to light.   Cardiovascular:      Rate and Rhythm: Normal rate and regular rhythm.   Pulmonary:      Effort: Pulmonary effort is normal.      Breath sounds: Normal breath sounds.   Abdominal:      General: Abdomen is flat.   Musculoskeletal:         General: Normal range of motion.      Cervical back: Normal range of motion.   Skin:     General: Skin is warm.      Findings: Rash is vesicular.      Comments: Vesicular rash located on the right hip approximately 3 cm in diameter.  Lesions are not yet crusting.   Neurological:      General: No focal deficit present.      Mental Status: She is alert and oriented to person, " place, and time.   Psychiatric:         Mood and Affect: Mood normal.         Behavior: Behavior normal.         Thought Content: Thought content normal.         Judgment: Judgment normal.            Procedures     Results:  No results found for this or any previous visit (from the past 24 hour(s)).     Assessment and Plan     Assessment/Plan:  Diagnoses and all orders for this visit:    1. Herpes zoster without complication (Primary)  Assessment & Plan:  Will start topical calamine lotion and give Valtrex course as directed.  Counseled patient on hygiene practices to avoid spread.  Advised patient if symptoms persist or worsen I would recommend an evaluation by dermatology.    Orders:  -     valACYclovir (Valtrex) 500 MG tablet; Take 2 tablets by mouth 3 (Three) Times a Day for 7 days.  Dispense: 42 tablet; Refill: 0  -     calamine lotion; Apply  topically to the appropriate area as directed 3 (Three) Times a Day As Needed for Itching (itching).  Dispense: 180 mL; Refill: 0        Follow Up  Return if symptoms worsen or fail to improve.    Belén Ravi, DO   [facility]

## 2023-12-18 NOTE — ASSESSMENT & PLAN NOTE
Will start topical calamine lotion and give Valtrex course as directed.  Counseled patient on hygiene practices to avoid spread.  Advised patient if symptoms persist or worsen I would recommend an evaluation by dermatology.

## 2023-12-27 ENCOUNTER — TELEPHONE (OUTPATIENT)
Dept: FAMILY MEDICINE CLINIC | Facility: CLINIC | Age: 43
End: 2023-12-27
Payer: MEDICARE

## 2023-12-27 DIAGNOSIS — B02.9 HERPES ZOSTER WITHOUT COMPLICATION: ICD-10-CM

## 2023-12-27 RX ORDER — VALACYCLOVIR HYDROCHLORIDE 1 G/1
500 TABLET, FILM COATED ORAL 2 TIMES DAILY
Qty: 14 TABLET | Refills: 0 | Status: SHIPPED | OUTPATIENT
Start: 2023-12-27

## 2023-12-27 NOTE — TELEPHONE ENCOUNTER
Caller: Mallorie Dover    Relationship: Self    Best call back number: [phone]     Requested Prescriptions: VALACYCLOVIR 500MG TABLETS     Pharmacy where request should be sent: [id number] [address] AT [facility] [address] - [phone]  - [phone] FX     Last office visit with prescribing clinician: 11/20/2023   Last telemedicine visit with prescribing clinician: Visit date not found   Next office visit with prescribing clinician: 2/21/2024     Additional details provided by patient: ANOTHER SHINGLES PLACE HAS STARTED. IT CLEARED UP AND CAME BACK IN ANOTHER PLACE    Does the patient have less than a 3 day supply:  [x] Yes  [] No    Would you like a call back once the refill request has been completed: [] Yes [x] No    If the office needs to give you a call back, can they leave a voicemail: [] Yes [x] No    Adama Bae, PCT   12/27/23 13:19 EST

## 2024-02-06 NOTE — TELEPHONE ENCOUNTER
Caller: Mallorie Dover    Relationship: Self    Best call back number: [phone]    What orders are you requesting (i.e. lab or imaging): BILATERAL DIAGNOSTIC MAMMOGRAM    In what timeframe would the patient need to come in: ASAP    Where will you receive your lab/imaging services: [facility]    Additional notes: PATIENT HAS APPOINTMENT TOMORROW FOR MAMMOGRAM HOWEVER ORDER SHOULD BE A BILATERAL DIAGNOSTIC MAMMOGRAM AND NOT A ROUTINE MAMMOGRAM. PLEASE SEND NEW ORDER.

## 2024-02-06 NOTE — TELEPHONE ENCOUNTER
Caller: Mallorie Dover    Relationship: Self    Best call back number: [phone]    Requested Prescriptions:   Requested Prescriptions     Pending Prescriptions Disp Refills    Acetaminophen 500 MG capsule       Sig: Take  by mouth.        Pharmacy where request should be sent:    [facility] [phone]  Last office visit with prescribing clinician: 11/20/2023   Last telemedicine visit with prescribing clinician: Visit date not found   Next office visit with prescribing clinician: 2/21/2024     Additional details provided by patient: PATIENT IS OUT OF MEDICATION  Does the patient have less than a 3 day supply:  [x] Yes  [] No    Would you like a call back once the refill request has been completed: [x] Yes [] No    If the office needs to give you a call back, can they leave a voicemail: [x] Yes [] No    Selena Lopez Rep   02/06/24 15:25 EST

## 2024-02-12 ENCOUNTER — TELEPHONE (OUTPATIENT)
Dept: FAMILY MEDICINE CLINIC | Facility: CLINIC | Age: 44
End: 2024-02-12
Payer: MEDICARE

## 2024-02-12 DIAGNOSIS — N64.59 ABNORMAL BREAST EXAM: ICD-10-CM

## 2024-02-12 DIAGNOSIS — N63.0 MULTIPLE BENIGN LUMPS OF BREAST: Primary | ICD-10-CM

## 2024-02-12 NOTE — TELEPHONE ENCOUNTER
Rayna from [facility] called because the patient has been referred by Dr. Worrell for a screening mammogram. The patient  has reported new breast lumps and they need Dr. Worrell to order a Bilateral Diagnostic Mammogram for this patient instead.. She went in for the screening 02/07/24, but the tech turned her away because they need this different test ordered.

## 2024-02-13 RX ORDER — VALACYCLOVIR HYDROCHLORIDE 1 G/1
500 TABLET, FILM COATED ORAL DAILY
Qty: 14 TABLET | Refills: 0 | Status: SHIPPED | OUTPATIENT
Start: 2024-02-13

## 2024-02-21 ENCOUNTER — OFFICE VISIT (OUTPATIENT)
Dept: FAMILY MEDICINE CLINIC | Facility: CLINIC | Age: 44
End: 2024-02-21
Payer: MEDICARE

## 2024-02-21 ENCOUNTER — HOSPITAL ENCOUNTER (OUTPATIENT)
Dept: GENERAL RADIOLOGY | Facility: HOSPITAL | Age: 44
Discharge: HOME | End: 2024-02-21
Admitting: INTERNAL MEDICINE
Payer: MEDICARE

## 2024-02-21 VITALS
SYSTOLIC BLOOD PRESSURE: 126 MMHG | DIASTOLIC BLOOD PRESSURE: 78 MMHG | WEIGHT: 176.8 LBS | BODY MASS INDEX: 32.54 KG/M2 | HEIGHT: 62 IN

## 2024-02-21 DIAGNOSIS — E66.09 CLASS 1 OBESITY DUE TO EXCESS CALORIES WITH SERIOUS COMORBIDITY AND BODY MASS INDEX (BMI) OF 32.0 TO 32.9 IN ADULT: ICD-10-CM

## 2024-02-21 DIAGNOSIS — M25.562 CHRONIC PAIN OF BOTH KNEES: ICD-10-CM

## 2024-02-21 DIAGNOSIS — I73.9 CLAUDICATION: ICD-10-CM

## 2024-02-21 DIAGNOSIS — H53.8 BLURRED VISION: ICD-10-CM

## 2024-02-21 DIAGNOSIS — K21.9 GASTROESOPHAGEAL REFLUX DISEASE, UNSPECIFIED WHETHER ESOPHAGITIS PRESENT: ICD-10-CM

## 2024-02-21 DIAGNOSIS — G89.29 CHRONIC PAIN OF BOTH KNEES: ICD-10-CM

## 2024-02-21 DIAGNOSIS — M25.561 CHRONIC PAIN OF BOTH KNEES: ICD-10-CM

## 2024-02-21 DIAGNOSIS — Z86.39 HISTORY OF DIABETES MELLITUS: Primary | ICD-10-CM

## 2024-02-21 DIAGNOSIS — Z00.00 MEDICARE ANNUAL WELLNESS VISIT, SUBSEQUENT: ICD-10-CM

## 2024-02-21 DIAGNOSIS — J43.9 PULMONARY EMPHYSEMA, UNSPECIFIED EMPHYSEMA TYPE: ICD-10-CM

## 2024-02-21 DIAGNOSIS — M25.551 RIGHT HIP PAIN: ICD-10-CM

## 2024-02-21 PROBLEM — E11.9 DIABETES MELLITUS: Chronic | Status: RESOLVED | Noted: 2021-01-24 | Resolved: 2024-02-21

## 2024-02-21 LAB
EXPIRATION DATE: NORMAL
HBA1C MFR BLD: 5.7 % (ref 4.5–5.7)
Lab: NORMAL

## 2024-02-21 PROCEDURE — 72100 X-RAY EXAM L-S SPINE 2/3 VWS: CPT

## 2024-02-21 PROCEDURE — 73502 X-RAY EXAM HIP UNI 2-3 VIEWS: CPT

## 2024-02-21 PROCEDURE — 73560 X-RAY EXAM OF KNEE 1 OR 2: CPT

## 2024-02-21 RX ORDER — SENNOSIDES 8.6 MG
650 CAPSULE ORAL EVERY 8 HOURS PRN
Qty: 120 TABLET | Refills: 2 | Status: SHIPPED | OUTPATIENT
Start: 2024-02-21

## 2024-02-21 RX ORDER — ESOMEPRAZOLE MAGNESIUM 40 MG/1
40 CAPSULE, DELAYED RELEASE ORAL
Qty: 90 CAPSULE | Refills: 1 | Status: SHIPPED | OUTPATIENT
Start: 2024-02-21

## 2024-02-21 RX ORDER — ESOMEPRAZOLE MAGNESIUM 40 MG/1
40 CAPSULE, DELAYED RELEASE ORAL
Qty: 90 CAPSULE | Refills: 1 | Status: SHIPPED | OUTPATIENT
Start: 2024-02-21 | End: 2024-02-21 | Stop reason: SDUPTHER

## 2024-02-21 NOTE — PROGRESS NOTES
The ABCs of the Annual Wellness Visit  Subsequent Medicare Wellness Visit    Subjective    Mallorie Dover is a 43 y.o. female who presents for a Subsequent Medicare Wellness Visit.    The following portions of the patient's history were reviewed and   updated as appropriate: She  has a past medical history of Arthritis, Asthma, Cancer, GERD (gastroesophageal reflux disease), Hypothyroidism, and Spondylosis.  She does not have any pertinent problems on file.  She  has a past surgical history that includes Gastric bypass open.  Her family history includes Arthritis in her mother; Cancer in her mother; Diabetes in her mother; Heart disease in her mother; Hyperlipidemia in her mother; Hypertension in her mother; Mental illness in her mother; Migraines in her mother; Tuberculosis in her mother.  She  reports that she quit smoking about 3 years ago. Her smoking use included cigarettes. She started smoking about 39 years ago. She has a 15.00 pack-year smoking history. She has never used smokeless tobacco. She reports that she does not currently use alcohol. She reports that she does not use drugs.  Current Outpatient Medications   Medication Sig Dispense Refill   • Advair Diskus 250-50 MCG/ACT DISKUS Inhale 1 puff 2 (Two) Times a Day. 1 each 9   • albuterol sulfate  (90 Base) MCG/ACT inhaler Inhale 2 puffs As Needed.     • azelastine (ASTELIN) 0.1 % nasal spray 2 sprays into the nostril(s) as directed by provider 2 (Two) Times a Day.     • calamine lotion Apply  topically to the appropriate area as directed 3 (Three) Times a Day As Needed for Itching (itching). 180 mL 0   • cetirizine (zyrTEC) 10 MG tablet Take 1 tablet by mouth Daily.     • cyclobenzaprine (FLEXERIL) 10 MG tablet Take 1 tablet by mouth As Needed.     • esomeprazole (nexIUM) 40 MG capsule Take 1 capsule by mouth Every Morning Before Breakfast. 90 capsule 1   • fluticasone (FLONASE) 50 MCG/ACT nasal spray 2 sprays into the nostril(s) as  directed by provider Daily.     • ibuprofen (ADVIL,MOTRIN) 800 MG tablet Take 1 tablet by mouth Every 12 (Twelve) Hours. 60 tablet 9   • Iron, Ferrous Sulfate, 325 (65 Fe) MG tablet Take 1 tablet by mouth Daily. 30 tablet 2   • multivitamin with minerals (MULTI FOR HER PO) Take 1 tablet by mouth Daily.     • tiZANidine (ZANAFLEX) 2 MG tablet Take 1 tablet by mouth Every 12 (Twelve) Hours As Needed for Muscle Spasms. 60 tablet 2   • traZODone (DESYREL) 100 MG tablet Take 1 tablet by mouth At Night As Needed for Sleep (night tremors). 30 tablet 2   • valACYclovir (VALTREX) 1000 MG tablet TAKE 1/2 TABLET BY MOUTH TWICE DAILY 14 tablet 0   • acetaminophen (Acetaminophen 8 Hour) 650 MG 8 hr tablet Take 1 tablet by mouth Every 8 (Eight) Hours As Needed for Mild Pain. 120 tablet 2     No current facility-administered medications for this visit.     Current Outpatient Medications on File Prior to Visit   Medication Sig   • Advair Diskus 250-50 MCG/ACT DISKUS Inhale 1 puff 2 (Two) Times a Day.   • albuterol sulfate  (90 Base) MCG/ACT inhaler Inhale 2 puffs As Needed.   • azelastine (ASTELIN) 0.1 % nasal spray 2 sprays into the nostril(s) as directed by provider 2 (Two) Times a Day.   • calamine lotion Apply  topically to the appropriate area as directed 3 (Three) Times a Day As Needed for Itching (itching).   • cetirizine (zyrTEC) 10 MG tablet Take 1 tablet by mouth Daily.   • cyclobenzaprine (FLEXERIL) 10 MG tablet Take 1 tablet by mouth As Needed.   • fluticasone (FLONASE) 50 MCG/ACT nasal spray 2 sprays into the nostril(s) as directed by provider Daily.   • ibuprofen (ADVIL,MOTRIN) 800 MG tablet Take 1 tablet by mouth Every 12 (Twelve) Hours.   • Iron, Ferrous Sulfate, 325 (65 Fe) MG tablet Take 1 tablet by mouth Daily.   • multivitamin with minerals (MULTI FOR HER PO) Take 1 tablet by mouth Daily.   • tiZANidine (ZANAFLEX) 2 MG tablet Take 1 tablet by mouth Every 12 (Twelve) Hours As Needed for Muscle Spasms.   •  traZODone (DESYREL) 100 MG tablet Take 1 tablet by mouth At Night As Needed for Sleep (night tremors).   • valACYclovir (VALTREX) 1000 MG tablet TAKE 1/2 TABLET BY MOUTH TWICE DAILY   • [DISCONTINUED] Acetaminophen 500 MG capsule Take 1 capsule by mouth Every 6 (Six) Hours As Needed for Mild Pain.     No current facility-administered medications on file prior to visit.     She is allergic to bupropion, seroquel [quetiapine], and gabapentin..    Compared to one year ago, the patient feels her physical   health is the same.    Compared to one year ago, the patient feels her mental   health is the same.    Recent Hospitalizations:  She was not admitted to the hospital during the last year.       Current Medical Providers:  Patient Care Team:  Doug Worrell MD as PCP - General (Internal Medicine)    Outpatient Medications Prior to Visit   Medication Sig Dispense Refill   • Advair Diskus 250-50 MCG/ACT DISKUS Inhale 1 puff 2 (Two) Times a Day. 1 each 9   • albuterol sulfate  (90 Base) MCG/ACT inhaler Inhale 2 puffs As Needed.     • azelastine (ASTELIN) 0.1 % nasal spray 2 sprays into the nostril(s) as directed by provider 2 (Two) Times a Day.     • calamine lotion Apply  topically to the appropriate area as directed 3 (Three) Times a Day As Needed for Itching (itching). 180 mL 0   • cetirizine (zyrTEC) 10 MG tablet Take 1 tablet by mouth Daily.     • cyclobenzaprine (FLEXERIL) 10 MG tablet Take 1 tablet by mouth As Needed.     • fluticasone (FLONASE) 50 MCG/ACT nasal spray 2 sprays into the nostril(s) as directed by provider Daily.     • ibuprofen (ADVIL,MOTRIN) 800 MG tablet Take 1 tablet by mouth Every 12 (Twelve) Hours. 60 tablet 9   • Iron, Ferrous Sulfate, 325 (65 Fe) MG tablet Take 1 tablet by mouth Daily. 30 tablet 2   • multivitamin with minerals (MULTI FOR HER PO) Take 1 tablet by mouth Daily.     • tiZANidine (ZANAFLEX) 2 MG tablet Take 1 tablet by mouth Every 12 (Twelve) Hours As Needed for Muscle  Spasms. 60 tablet 2   • traZODone (DESYREL) 100 MG tablet Take 1 tablet by mouth At Night As Needed for Sleep (night tremors). 30 tablet 2   • valACYclovir (VALTREX) 1000 MG tablet TAKE 1/2 TABLET BY MOUTH TWICE DAILY 14 tablet 0   • Acetaminophen 500 MG capsule Take 1 capsule by mouth Every 6 (Six) Hours As Needed for Mild Pain. 360 capsule 2     No facility-administered medications prior to visit.       No opioid medication identified on active medication list. I have reviewed chart for other potential  high risk medication/s and harmful drug interactions in the elderly.      Aspirin is not on active medication list.  Aspirin use is not indicated based on review of current medical condition/s. Risk of harm outweighs potential benefits.  .    Patient Active Problem List   Diagnosis   • Abnormal finding on mammography   • Polycystic ovaries   • Dyspnea   • Medicare annual wellness visit, subsequent   • Fatigue   • Heartburn   • History of back pain   • History of depression   • Hyperlipidemia   • Essential hypertension   • Insomnia   • Mixed papillary and follicular carcinoma   • Neuropathy   • Posttraumatic stress disorder   • Goiter   • Neoplasm of thyroid   • Thyroid nodule   • Tobacco dependence syndrome   • Vitamin D deficiency   • Bilateral carpal tunnel syndrome   • Class 1 obesity due to excess calories with serious comorbidity and body mass index (BMI) of 32.0 to 32.9 in adult   • Mild intermittent asthma without complication   • Anxiety   • Arthritis   • Asthma   • Carpal tunnel syndrome   • Bipolar disorder   • Herpes zoster without complication   • COPD (chronic obstructive pulmonary disease) with emphysema   • Gastroesophageal reflux disease without esophagitis   • Claudication     Advance Care Planning   Advance Care Planning     Advance Directive is not on file.  ACP discussion was held with the patient during this visit. Patient does not have an advance directive, information provided.     Objective   "  Vitals:    24 1550   BP: 126/78   Weight: 80.2 kg (176 lb 12.8 oz)   Height: 157.5 cm (62.01\")     Estimated body mass index is 32.33 kg/m² as calculated from the following:    Height as of this encounter: 157.5 cm (62.01\").    Weight as of this encounter: 80.2 kg (176 lb 12.8 oz).    BMI is >= 30 and <35. (Class 1 Obesity). The following options were offered after discussion;: weight loss educational material (shared in after visit summary), exercise counseling/recommendations, and nutrition counseling/recommendations  She is losing weight aon Mediterranean diet..    Does the patient have evidence of cognitive impairment? No    Lab Results   Component Value Date    HGBA1C 5.7 2024        HEALTH RISK ASSESSMENT    Smoking Status:  Social History     Tobacco Use   Smoking Status Former   • Packs/day: 1.00   • Years: 15.00   • Additional pack years: 0.00   • Total pack years: 15.00   • Types: Cigarettes   • Start date: 1985   • Quit date: 2020   • Years since quitting: 3.4   Smokeless Tobacco Never     Alcohol Consumption:  Social History     Substance and Sexual Activity   Alcohol Use Not Currently     Fall Risk Screen:    DANA Fall Risk Assessment was completed, and patient is at LOW risk for falls.Assessment completed on:2024    Depression Screenin/21/2024     4:01 PM   PHQ-2/PHQ-9 Depression Screening   Little Interest or Pleasure in Doing Things 0-->not at all   Feeling Down, Depressed or Hopeless 0-->not at all   PHQ-9: Brief Depression Severity Measure Score 0       Health Habits and Functional and Cognitive Screenin/21/2024     4:00 PM   Functional & Cognitive Status   Do you have difficulty preparing food and eating? No   Do you have difficulty bathing yourself, getting dressed or grooming yourself? No   Do you have difficulty using the toilet? No   Do you have difficulty moving around from place to place? No   Do you have trouble with steps or getting out of a " bed or a chair? No   Current Diet Well Balanced Diet   Dental Exam Not up to date   Eye Exam Not up to date   Exercise (times per week) 0 times per week   Current Exercises Include No Regular Exercise   Do you need help using the phone?  No   Are you deaf or do you have serious difficulty hearing?  No   Do you need help to go to places out of walking distance? Yes   Do you need help shopping? No   Do you need help preparing meals?  No   Do you need help with housework?  No   Do you need help with laundry? No   Do you need help taking your medications? No   Do you need help managing money? No   Do you ever drive or ride in a car without wearing a seat belt? No   Have you felt unusual stress, anger or loneliness in the last month? No   Who do you live with? Spouse   If you need help, do you have trouble finding someone available to you? No   Have you been bothered in the last four weeks by sexual problems? No   Do you have difficulty concentrating, remembering or making decisions? No       Age-appropriate Screening Schedule:  Refer to the list below for future screening recommendations based on patient's age, sex and/or medical conditions. Orders for these recommended tests are listed in the plan section. The patient has been provided with a written plan.    Health Maintenance   Topic Date Due   • Hepatitis B (1 of 3 - 3-dose series) Never done   • Pneumococcal Vaccine 0-64 (2 of 2 - PCV) 08/17/2021   • DIABETIC FOOT EXAM  Never done   • PAP SMEAR  Never done   • TDAP/TD VACCINES (2 - Td or Tdap) 06/09/2024   • COLORECTAL CANCER SCREENING  06/14/2024   • HEMOGLOBIN A1C  08/21/2024   • LIPID PANEL  11/20/2024   • URINE MICROALBUMIN  11/20/2024   • ANNUAL WELLNESS VISIT  02/21/2025   • BMI FOLLOWUP  02/21/2025   • HEPATITIS C SCREENING  Completed   • COVID-19 Vaccine  Completed   • INFLUENZA VACCINE  Completed   • DIABETIC EYE EXAM  Discontinued                  CMS Preventative Services Quick Reference  Risk Factors  Identified During Encounter  Immunizations Discussed/Encouraged: Prevnar 20 (Pneumococcal 20-valent conjugate)  Dental Screening Recommended  Vision Screening Recommended  The above risks/problems have been discussed with the patient.  Pertinent information has been shared with the patient in the After Visit Summary.  An After Visit Summary and PPPS were made available to the patient.    Follow Up:   Next Medicare Wellness visit to be scheduled in 1 year.       Additional E&M Note during same encounter follows:  Patient has multiple medical problems which are significant and separately identifiable that require additional work above and beyond the Medicare Wellness Visit.      Chief Complaint  Medicare Wellness-subsequent    Subjective        HPI  Mallorie Dover is also being seen today for Previously was treated for HTN and DM prior to gastric bypass. Off mediations x 2 years. Previously on metformin and jardiance. Of 2 years.   Previously treated for thyroid cancer ( off thyroid meds x 2 years). Was supposed to be followed by endocrinology and would like to establish . He is on stable dose of Advair and albuterol for COPD: She relatively rarely uses albuterol.  Is in need of a refill today.     Treated for ptsd and on disability- on no medications- no psychiatrist.   She still needs to see gyneocology, mamogram has been scheduled past due  She has not had colon cancer screening- mother had colon cancer.  Denies any abdominal pain blood in her stool or change in her bowel habits.      Bilateral knee pain, instability and stiffens.She has swelling in the past. Has never had injections. Ibuprofen helps a little.  Also right hip pain has a sharp worse with weight bearing.    She feels she may have arterial blockages. She reports LE heaviness was fatigue with walking 100-200 feet. Was told she has an 85% blockage in left.  She does not take aspirin.  She indicates she previously smoked however quit in  "2020.    Review of Systems   Constitutional:  Positive for fatigue.   HENT: Negative.     Eyes: Negative.    Respiratory: Negative.     Cardiovascular:         Leg fatigue with ambulation of 1 to 200 feet.   Gastrointestinal:         Worsening reflux despite 40 mg Prilosec daily   Endocrine: Negative.    Musculoskeletal:  Positive for arthralgias, back pain and joint swelling.   Neurological: Negative.    Hematological: Negative.    Psychiatric/Behavioral:  The patient is nervous/anxious.        Objective   Vital Signs:  /78   Ht 157.5 cm (62.01\")   Wt 80.2 kg (176 lb 12.8 oz)   BMI 32.33 kg/m²     Physical Exam  Vitals and nursing note reviewed.   Constitutional:       General: She is not in acute distress.     Appearance: She is well-developed. She is not diaphoretic.   HENT:      Head: Normocephalic and atraumatic.      Right Ear: External ear normal.      Left Ear: External ear normal.      Mouth/Throat:      Pharynx: No oropharyngeal exudate.   Eyes:      General: No scleral icterus.        Right eye: No discharge.      Conjunctiva/sclera: Conjunctivae normal.   Neck:      Thyroid: No thyromegaly.      Vascular: No JVD.      Trachea: No tracheal deviation.   Cardiovascular:      Rate and Rhythm: Normal rate and regular rhythm.      Pulses:           Carotid pulses are 2+ on the right side and 2+ on the left side.       Radial pulses are 2+ on the right side and 2+ on the left side.        Dorsalis pedis pulses are 1+ on the right side.        Posterior tibial pulses are 1+ on the right side and 1+ on the left side.      Heart sounds: Normal heart sounds.      Comments: PMI nondisplaced  Pulmonary:      Effort: Pulmonary effort is normal.      Breath sounds: Normal breath sounds. No wheezing or rales.   Abdominal:      General: Bowel sounds are normal.      Palpations: Abdomen is soft.      Tenderness: There is no abdominal tenderness. There is no guarding or rebound.   Musculoskeletal:      Cervical " back: Normal range of motion and neck supple.      Right lower leg: No edema.      Left lower leg: No edema.   Lymphadenopathy:      Cervical: No cervical adenopathy.   Skin:     General: Skin is warm and dry.      Capillary Refill: Capillary refill takes less than 2 seconds.      Coloration: Skin is not pale.      Findings: No rash.   Neurological:      Mental Status: She is alert and oriented to person, place, and time.      Motor: No abnormal muscle tone.      Coordination: Coordination normal.   Psychiatric:         Judgment: Judgment normal.                  10/17/2023 4:45 PM EDT   This result has an attachment that is not available.   Date of service: 10/12/2023    PULMONARY FUNCTION TEST  Flow-volume loop meet ATS criteria.  Spirometry obtained using  prebronchodilator maneuvers.  Lung volumes were measured by  plethysmography.    IMPRESSION:  1.  There is a mild airflow obstruction categorizing it as COPD GOLD group  1  2.  Normal lung volumes without evidence of restrictive lung component  3.  Normal DLCO    Clinical correlation is advised       Assessment and Plan   Diagnoses and all orders for this visit:    1. History of diabetes mellitus (Primary)  -     POC Glycosylated Hemoglobin (Hb A1C)    2. Blurred vision  -     Ambulatory Referral for Diabetic Eye Exam-Ophthalmology    3. Gastroesophageal reflux disease, unspecified whether esophagitis present  -     Ambulatory referral for Screening EGD    4. Right hip pain  -     XR Spine Lumbar 2 or 3 View; Future  -     XR Hip With or Without Pelvis 2 - 3 View Right; Future    5. Chronic pain of both knees  -     XR Knee 1 or 2 View Bilateral; Future    6. Claudication  Assessment & Plan:  I added aspirin to her regimen.  I have requested records diagnostic studies performed last year.  She is requested to referral to vascular surgery for further evaluation.    Orders:  -     Ambulatory Referral to Vascular Surgery    7. Medicare annual wellness visit,  subsequent    8. Class 1 obesity due to excess calories with serious comorbidity and body mass index (BMI) of 32.0 to 32.9 in adult    9. Pulmonary emphysema, unspecified emphysema type    Other orders  -     Cancel: Pneumococcal Conjugate Vaccine 20-Valent (PCV20)  -     Discontinue: esomeprazole (nexIUM) 40 MG capsule; Take 1 capsule by mouth Every Morning Before Breakfast.  Dispense: 90 capsule; Refill: 1  -     acetaminophen (Acetaminophen 8 Hour) 650 MG 8 hr tablet; Take 1 tablet by mouth Every 8 (Eight) Hours As Needed for Mild Pain.  Dispense: 120 tablet; Refill: 2  -     esomeprazole (nexIUM) 40 MG capsule; Take 1 capsule by mouth Every Morning Before Breakfast.  Dispense: 90 capsule; Refill: 1    Patient herself needs to reschedule her mammogram, colonoscopy, gynecology as well as hand surgery appointment.         Follow Up   Return in about 6 months (around 8/21/2024) for gerd copd claudications.  Patient was given instructions and counseling regarding her condition or for health maintenance advice. Please see specific information pulled into the AVS if appropriate.

## 2024-02-21 NOTE — ASSESSMENT & PLAN NOTE
I added aspirin to her regimen.  I have requested records diagnostic studies performed last year.  She is requested to referral to vascular surgery for further evaluation.

## 2024-02-22 NOTE — PROGRESS NOTES
Mild arthritis of the hip back and knees.  She was referred to Ortho however she may benefit from physical therapy.

## 2024-02-29 ENCOUNTER — HOSPITAL ENCOUNTER (OUTPATIENT)
Dept: ULTRASOUND IMAGING | Facility: HOSPITAL | Age: 44
Discharge: HOME | End: 2024-02-29
Payer: MEDICARE

## 2024-02-29 ENCOUNTER — HOSPITAL ENCOUNTER (OUTPATIENT)
Dept: MAMMOGRAPHY | Facility: HOSPITAL | Age: 44
Discharge: HOME | End: 2024-02-29
Payer: MEDICARE

## 2024-02-29 DIAGNOSIS — N64.59 ABNORMAL BREAST EXAM: ICD-10-CM

## 2024-02-29 DIAGNOSIS — N63.0 MULTIPLE BENIGN LUMPS OF BREAST: ICD-10-CM

## 2024-02-29 PROCEDURE — 76642 ULTRASOUND BREAST LIMITED: CPT | Performed by: RADIOLOGY

## 2024-02-29 PROCEDURE — 76642 ULTRASOUND BREAST LIMITED: CPT

## 2024-02-29 PROCEDURE — 77066 DX MAMMO INCL CAD BI: CPT

## 2024-02-29 PROCEDURE — G0279 TOMOSYNTHESIS, MAMMO: HCPCS | Performed by: RADIOLOGY

## 2024-02-29 PROCEDURE — G0279 TOMOSYNTHESIS, MAMMO: HCPCS

## 2024-02-29 PROCEDURE — 77066 DX MAMMO INCL CAD BI: CPT | Performed by: RADIOLOGY

## 2024-06-11 ENCOUNTER — TELEPHONE (OUTPATIENT)
Dept: FAMILY MEDICINE CLINIC | Facility: CLINIC | Age: 44
End: 2024-06-11
Payer: MEDICARE

## 2024-06-11 RX ORDER — FLUTICASONE PROPIONATE 50 MCG
2 SPRAY, SUSPENSION (ML) NASAL DAILY
Qty: 48 G | Refills: 2 | Status: SHIPPED | OUTPATIENT
Start: 2024-06-11

## 2024-06-11 NOTE — TELEPHONE ENCOUNTER
Caller: Mallorie Dover    Relationship: Self    Best call back number: [phone]    Requested Prescriptions:   Requested Prescriptions      No prescriptions requested or ordered in this encounter        fluticasone (FLONASE) 50 MCG/ACT nasal spray     Pharmacy where request should be sent: [id number], located [facility] [address]  [facility] [phone] [facility] [phone] FX     Last office visit with prescribing clinician: 2/21/2024   Last telemedicine visit with prescribing clinician: Visit date not found   Next office visit with prescribing clinician: 8/21/2024     Additional details provided by patient:     COMPLETELY OUT OF MEDICATION    Does the patient have less than a 3 day supply:  [x] Yes  [] No    Would you like a call back once the refill request has been completed: [] Yes [x] No    If the office needs to give you a call back, can they leave a voicemail: [] Yes [x] No    Paloma Mendez, PCT   06/11/24 10:47 EDT

## 2024-06-24 RX ORDER — FLUTICASONE PROPIONATE 50 MCG
2 SPRAY, SUSPENSION (ML) NASAL DAILY
Qty: 48 G | Refills: 2 | Status: SHIPPED | OUTPATIENT
Start: 2024-06-24

## 2024-06-24 RX ORDER — ESOMEPRAZOLE MAGNESIUM 40 MG/1
40 CAPSULE, DELAYED RELEASE ORAL
Qty: 90 CAPSULE | Refills: 1 | Status: SHIPPED | OUTPATIENT
Start: 2024-06-24

## 2024-06-24 RX ORDER — FLUTICASONE PROPIONATE AND SALMETEROL 50; 250 UG/1; UG/1
1 POWDER RESPIRATORY (INHALATION)
Qty: 1 EACH | Refills: 9 | Status: SHIPPED | OUTPATIENT
Start: 2024-06-24

## 2024-06-24 RX ORDER — TIZANIDINE 2 MG/1
2 TABLET ORAL EVERY 12 HOURS PRN
Qty: 180 TABLET | Refills: 0 | Status: SHIPPED | OUTPATIENT
Start: 2024-06-24

## 2024-06-24 NOTE — TELEPHONE ENCOUNTER
Caller: DoverYuniorMalloriefelicitas Lombardi    Relationship: Self    Best call back number: [phone]     Requested Prescriptions:   Requested Prescriptions     Pending Prescriptions Disp Refills    fluticasone (FLONASE) 50 MCG/ACT nasal spray 48 g 2     Si sprays by Each Nare route Daily. Shake well before using.    tiZANidine (ZANAFLEX) 2 MG tablet 60 tablet 2     Sig: Take 1 tablet by mouth Every 12 (Twelve) Hours As Needed for Muscle Spasms.    esomeprazole (nexIUM) 40 MG capsule 90 capsule 1     Sig: Take 1 capsule by mouth Every Morning Before Breakfast.    Advair Diskus 250-50 MCG/ACT DISKUS 1 each 9     Sig: Inhale 1 puff 2 (Two) Times a Day.        Pharmacy where request should be sent: [facility] #[id number] [place], [place] - [address] AT [facility]  & [address] [facility] - [phone]  - [phone] FX     Last office visit with prescribing clinician: 2024   Last telemedicine visit with prescribing clinician: Visit date not found   Next office visit with prescribing clinician: 2024     Additional details provided by patient: OUT OF MEDICATION     Does the patient have less than a 3 day supply:  [x] Yes  [] No    Would you like a call back once the refill request has been completed: [] Yes [x] No    If the office needs to give you a call back, can they leave a voicemail: [] Yes [x] No    Selena Craig   24 13:39 EDT

## 2024-07-15 ENCOUNTER — TELEPHONE (OUTPATIENT)
Dept: FAMILY MEDICINE CLINIC | Facility: CLINIC | Age: 44
End: 2024-07-15
Payer: MEDICARE

## 2024-07-15 DIAGNOSIS — B02.9 HERPES ZOSTER WITHOUT COMPLICATION: ICD-10-CM

## 2024-07-15 RX ORDER — VALACYCLOVIR HYDROCHLORIDE 1 G/1
500 TABLET, FILM COATED ORAL DAILY
Qty: 14 TABLET | Refills: 0 | Status: SHIPPED | OUTPATIENT
Start: 2024-07-15

## 2024-07-15 RX ORDER — CALAMINE
LOTION (ML) TOPICAL 3 TIMES DAILY PRN
Qty: 180 ML | Refills: 0 | Status: SHIPPED | OUTPATIENT
Start: 2024-07-15

## 2024-07-15 NOTE — TELEPHONE ENCOUNTER
Caller: DoverMallorie    Relationship: Self    Best call back number: [phone]    Requested Prescriptions:   Requested Prescriptions     Pending Prescriptions Disp Refills    calamine lotion 180 mL 0     Sig: Apply  topically to the appropriate area as directed 3 (Three) Times a Day As Needed for Itching (itching).    valACYclovir (VALTREX) 1000 MG tablet 14 tablet 0     Sig: Take 0.5 tablets by mouth Daily.        Pharmacy where request should be sent: [facility] #[id number] [address]  AT [facility]  & [address] [facility] - [phone]  - [phone] FX     Last office visit with prescribing clinician: 2/21/2024   Last telemedicine visit with prescribing clinician: Visit date not found   Next office visit with prescribing clinician: 8/8/2024     Additional details provided by patient: PATIENT IS OUT OF MEDICATION AND HAS SHINGLES    Does the patient have less than a 3 day supply:  [x] Yes  [] No    Would you like a call back once the refill request has been completed: [x] Yes [] No    If the office needs to give you a call back, can they leave a voicemail: [x] Yes [] No    Selena Lopez   07/15/24 12:26 EDT

## 2024-07-15 NOTE — TELEPHONE ENCOUNTER
Rx Refill Note  Requested Prescriptions     Pending Prescriptions Disp Refills    calamine lotion 180 mL 0     Sig: Apply  topically to the appropriate area as directed 3 (Three) Times a Day As Needed for Itching (itching).    valACYclovir (VALTREX) 1000 MG tablet 14 tablet 0     Sig: Take 0.5 tablets by mouth Daily.      Last office visit with prescribing clinician: 2/21/2024   Last telemedicine visit with prescribing clinician: Visit date not found   Next office visit with prescribing clinician: 8/8/2024                         Would you like a call back once the refill request has been completed: [] Yes [] No    If the office needs to give you a call back, can they leave a voicemail: [] Yes [] No    Sissy Pitt MA  07/15/24, 12:34 EDT

## 2024-07-25 RX ORDER — SODIUM PICOSULFATE, MAGNESIUM OXIDE, AND ANHYDROUS CITRIC ACID 10; 3.5; 12 MG/160ML; G/160ML; G/160ML
350 LIQUID ORAL TAKE AS DIRECTED
Qty: 350 ML | Refills: 0 | Status: SHIPPED | OUTPATIENT
Start: 2024-07-25

## 2024-07-31 ENCOUNTER — TELEPHONE (OUTPATIENT)
Dept: GASTROENTEROLOGY | Facility: CLINIC | Age: 44
End: 2024-07-31
Payer: MEDICARE

## 2024-07-31 RX ORDER — ONDANSETRON 4 MG/1
4 TABLET, ORALLY DISINTEGRATING ORAL EVERY 8 HOURS PRN
Qty: 6 TABLET | Refills: 0 | Status: SHIPPED | OUTPATIENT
Start: 2024-07-31

## 2024-07-31 NOTE — TELEPHONE ENCOUNTER
Caller: Mallorie Dover    Relationship to patient: Self    Best call back number: [phone]    Patient is needing: PT IS SCHEDULED FOR A COLONOSCOPY TOMORROW, AND WAS TOLD SHE COULD GET SOME NAUSEA MEDICATION BUT IT WAS NEVER SENT TO HER PHARMACY. PT WANTED TO SEE IF SHE COULD STILL GET THAT IN CASE SHE GETS NAUSEOUS TODAY DOING HER PREP.

## 2024-08-14 RX ORDER — MELOXICAM 15 MG/1
15 TABLET ORAL DAILY
Qty: 90 TABLET | Refills: 1 | Status: SHIPPED | OUTPATIENT
Start: 2024-08-14

## 2024-08-21 ENCOUNTER — OFFICE VISIT (OUTPATIENT)
Dept: FAMILY MEDICINE CLINIC | Facility: CLINIC | Age: 44
End: 2024-08-21
Payer: MEDICARE

## 2024-08-21 VITALS
SYSTOLIC BLOOD PRESSURE: 124 MMHG | WEIGHT: 179.2 LBS | DIASTOLIC BLOOD PRESSURE: 78 MMHG | HEART RATE: 66 BPM | HEIGHT: 62 IN | OXYGEN SATURATION: 98 % | BODY MASS INDEX: 32.97 KG/M2

## 2024-08-21 DIAGNOSIS — G56.03 BILATERAL CARPAL TUNNEL SYNDROME: ICD-10-CM

## 2024-08-21 DIAGNOSIS — M54.41 CHRONIC RIGHT-SIDED LOW BACK PAIN WITH RIGHT-SIDED SCIATICA: ICD-10-CM

## 2024-08-21 DIAGNOSIS — Z85.850 HISTORY OF THYROID CANCER: Primary | ICD-10-CM

## 2024-08-21 DIAGNOSIS — N93.9 ABNORMAL VAGINAL BLEEDING: ICD-10-CM

## 2024-08-21 DIAGNOSIS — G89.29 CHRONIC RIGHT-SIDED LOW BACK PAIN WITH RIGHT-SIDED SCIATICA: ICD-10-CM

## 2024-08-21 DIAGNOSIS — R29.898 RIGHT LEG WEAKNESS: ICD-10-CM

## 2024-08-21 DIAGNOSIS — R93.7 ABNORMAL MRI, LUMBAR SPINE: ICD-10-CM

## 2024-08-21 RX ORDER — SODIUM PICOSULFATE, MAGNESIUM OXIDE, AND ANHYDROUS CITRIC ACID 10; 3.5; 12 MG/160ML; G/160ML; G/160ML
350 LIQUID ORAL TAKE AS DIRECTED
Qty: 350 ML | Refills: 0 | Status: SHIPPED | OUTPATIENT
Start: 2024-08-21 | End: 2024-08-21

## 2024-08-21 RX ORDER — ONDANSETRON 4 MG/1
4 TABLET, ORALLY DISINTEGRATING ORAL EVERY 8 HOURS PRN
Qty: 6 TABLET | Refills: 0 | Status: SHIPPED | OUTPATIENT
Start: 2024-08-21 | End: 2024-08-21

## 2024-08-21 NOTE — PROGRESS NOTES
Mallorie Dover  1980  [id number]  Patient Care Team:  Doug Worrell MD as PCP - General (Internal Medicine)    Mallorie Dover is a 44 y.o. female here today for follow up.     This patient is accompanied by their self who contributes to the history of their care.    Chief Complaint:    Chief Complaint   Patient presents with    Heartburn    COPD        History of Present Illness:  I have reviewed and/or updated the patient's past medical, past surgical, family, social history, problem list and allergies as appropriate.     Mallorie   for follow-up.  Significant other was significantly requiring iliac bypass.  As such she was unable to keep a lot of her appointments.  Previously treated for thyroid cancer ( off thyroid meds x 2 years). Was supposed to be followed by endocrinology in [facility] and would like to establish .  She remains on a stable dose of Advair and albuterol for COPD: She relatively rarely uses albuterol. .     Treated for ptsd and on disability- on no medications- no psychiatrist.   She still needs to see gyneocology, mamogram has been completed . she has not had colon cancer screening- mother had colon cancer.  Denies any abdominal pain blood in her stool or change in her bowel habits. Sees GI end of the month      Bilateral knee pain, instability and stiffens.She has swelling in the past. Has never had injections. Ibuprofen helps a little.  Also right hip pain has a sharp worse with weight bearing. Xrays were reassuring with only minimal arthritis.     Her hurt with ambulation legs right > left to her foot. She does have an element of neuropathy. She has chronic lbp. Did PT for months with no improvement     Still with hand pain, numbness  despite braces. Has severe CTS.  Left greater than right hand numbness.  Hands continually go to sleep.    She has a history of vaginal polyps.  She had been amenorrheic for several months going on up to a year however significant heavy  "bleeding.  She would like to be referred for vaginal bleeding to gynecology.  There is some pelvic discomfort.     She ffelt she may have arterial blockages.  However this was refuted after second opinion.  She reports LE heaviness was fatigue with walking 100-200 feet. Was told she has an 75% blockage in left.  This was done by podiatry.  She does not take aspirin.  She indicates she previously smoked however quit in 2020.  Report showed normal ABIs and TBI values however velocities suggestive of 30 to 49% stenosis of the right profundus femoral and left SFA.  50 to 70% stenosis of the bilateral CFA's.  GEORGE was recommended.  The scans were completed 8/3/2023. She saw [facility] which had reassuring duplex and will follow in a year.     Review of Systems   Eyes: Negative.    Respiratory: Negative.     Gastrointestinal:  Positive for abdominal pain, GERD and indigestion.   Endocrine: Negative.    Genitourinary:  Positive for vaginal bleeding.   Musculoskeletal:  Positive for back pain.   Neurological:  Positive for weakness and numbness.       Vitals:    08/21/24 1522   BP: 124/78   Pulse: 66   SpO2: 98%   Weight: 81.3 kg (179 lb 3.2 oz)   Height: 157.5 cm (62.01\")     Body mass index is 32.77 kg/m².    Physical Exam  Vitals and nursing note reviewed.   Constitutional:       General: She is not in acute distress.     Appearance: She is well-developed. She is not diaphoretic.   HENT:      Head: Normocephalic and atraumatic.      Right Ear: External ear normal.      Left Ear: External ear normal.      Mouth/Throat:      Pharynx: No oropharyngeal exudate.   Eyes:      General: No scleral icterus.        Right eye: No discharge.      Conjunctiva/sclera: Conjunctivae normal.   Neck:      Thyroid: No thyromegaly.      Vascular: No JVD.      Trachea: No tracheal deviation.   Cardiovascular:      Rate and Rhythm: Normal rate and regular rhythm.      Heart sounds: Normal heart sounds.      Comments: PMI " nondisplaced  Pulmonary:      Effort: Pulmonary effort is normal.      Breath sounds: Normal breath sounds. No wheezing or rales.   Abdominal:      General: Bowel sounds are normal.      Palpations: Abdomen is soft.      Tenderness: There is no abdominal tenderness. There is no guarding or rebound.   Musculoskeletal:      Cervical back: Normal range of motion and neck supple.      Comments: Bilateral Tinel's sign.  She does have diffuse lumbar pain.  Flexor strength seems diminished.  Patellar reflexes r left greater than right 2+ 1+.   Lymphadenopathy:      Cervical: No cervical adenopathy.   Skin:     General: Skin is warm and dry.      Capillary Refill: Capillary refill takes less than 2 seconds.      Coloration: Skin is not pale.      Findings: No rash.   Neurological:      Mental Status: She is alert and oriented to person, place, and time.      Motor: No abnormal muscle tone.      Coordination: Coordination normal.   Psychiatric:         Mood and Affect: Mood normal.         Behavior: Behavior normal.         Judgment: Judgment normal.         Procedures    Results Review:    I reviewed the patient's new clinical results.    Assessment/Plan:    Problem List Items Addressed This Visit       Bilateral carpal tunnel syndrome    Relevant Orders    Ambulatory Referral to Hand Surgery     Other Visit Diagnoses       History of thyroid cancer    -  Primary    Relevant Orders    Ambulatory Referral to Endocrinology (Completed)    Abnormal MRI, lumbar spine        Right leg weakness        Relevant Orders    MRI Lumbar Spine Without Contrast    Chronic right-sided low back pain with right-sided sciatica        Relevant Orders    MRI Lumbar Spine Without Contrast    Abnormal vaginal bleeding        Relevant Orders    Ambulatory Referral to Gynecology        Is refreshing and no other lower extremity symptoms are not vascular in nature.  Will turn her focus on her lumbar spine.  She has worsening pain what sounds like  neurogenic claudication.  I have recommended an MRI of the right leg weakness.  She is tried physical therapy twice.  X-ray was not very revealing with the exception of some spondylosis.    Plan of care reviewed with patient at the conclusion of today's visit. Education was provided regarding diagnosis and management.  Patient verbalizes understanding of and agreement with management plan.    Return in about 6 months (around 2/21/2025) for Medicare Wellness.    Doug Worrell MD      Please note than portions of this note were completed wt a Voice Recognition Program

## 2024-08-28 ENCOUNTER — OUTSIDE FACILITY SERVICE (OUTPATIENT)
Dept: GASTROENTEROLOGY | Facility: CLINIC | Age: 44
End: 2024-08-28
Payer: MEDICARE

## 2024-08-28 PROCEDURE — 88305 TISSUE EXAM BY PATHOLOGIST: CPT | Performed by: INTERNAL MEDICINE

## 2024-08-28 PROCEDURE — 45380 COLONOSCOPY AND BIOPSY: CPT | Performed by: INTERNAL MEDICINE

## 2024-08-28 PROCEDURE — 43239 EGD BIOPSY SINGLE/MULTIPLE: CPT | Performed by: INTERNAL MEDICINE

## 2024-08-29 ENCOUNTER — LAB REQUISITION (OUTPATIENT)
Dept: LAB | Facility: HOSPITAL | Age: 44
End: 2024-08-29
Payer: MEDICARE

## 2024-08-29 DIAGNOSIS — K21.9 GASTRO-ESOPHAGEAL REFLUX DISEASE WITHOUT ESOPHAGITIS: ICD-10-CM

## 2024-08-29 DIAGNOSIS — R10.9 UNSPECIFIED ABDOMINAL PAIN: ICD-10-CM

## 2024-08-29 DIAGNOSIS — K64.8 OTHER HEMORRHOIDS: ICD-10-CM

## 2024-08-29 DIAGNOSIS — Z98.84 BARIATRIC SURGERY STATUS: ICD-10-CM

## 2024-08-29 DIAGNOSIS — Z12.11 ENCOUNTER FOR SCREENING FOR MALIGNANT NEOPLASM OF COLON: ICD-10-CM

## 2024-08-29 DIAGNOSIS — K31.89 OTHER DISEASES OF STOMACH AND DUODENUM: ICD-10-CM

## 2024-08-29 DIAGNOSIS — K22.89 OTHER SPECIFIED DISEASE OF ESOPHAGUS: ICD-10-CM

## 2024-08-30 LAB — REF LAB TEST METHOD: NORMAL

## 2024-09-03 ENCOUNTER — HOSPITAL ENCOUNTER (OUTPATIENT)
Dept: MRI IMAGING | Facility: HOSPITAL | Age: 44
Discharge: HOME | End: 2024-09-03
Admitting: INTERNAL MEDICINE
Payer: MEDICARE

## 2024-09-03 DIAGNOSIS — G89.29 CHRONIC RIGHT-SIDED LOW BACK PAIN WITH RIGHT-SIDED SCIATICA: ICD-10-CM

## 2024-09-03 DIAGNOSIS — M54.41 CHRONIC RIGHT-SIDED LOW BACK PAIN WITH RIGHT-SIDED SCIATICA: ICD-10-CM

## 2024-09-03 DIAGNOSIS — R29.898 RIGHT LEG WEAKNESS: ICD-10-CM

## 2024-09-03 PROCEDURE — 72148 MRI LUMBAR SPINE W/O DYE: CPT

## 2024-09-04 ENCOUNTER — TELEPHONE (OUTPATIENT)
Dept: FAMILY MEDICINE CLINIC | Facility: CLINIC | Age: 44
End: 2024-09-04
Payer: MEDICARE

## 2024-09-04 NOTE — TELEPHONE ENCOUNTER
Caller: Mallorie Dover    Relationship: Self    Best call back number: [phone]    Caller requesting test results: SELF    What test was performed: MRI    When was the test performed: 09/03/2024    Where was the test performed: [facility]    Additional notes: REQUESTING RESULTS     PATIENT WANTED TO LET YOU KNOW SHE DOES NOT DRINK MILK OR TAKE CALCIUM SUPPLEMENTS. PATIENT IS LACTOSE

## 2024-09-06 ENCOUNTER — TELEPHONE (OUTPATIENT)
Dept: FAMILY MEDICINE CLINIC | Facility: CLINIC | Age: 44
End: 2024-09-06
Payer: MEDICARE

## 2024-09-06 DIAGNOSIS — R29.818 NEUROGENIC CLAUDICATION: ICD-10-CM

## 2024-09-06 DIAGNOSIS — R93.7 ABNORMAL MRI, LUMBAR SPINE: Primary | ICD-10-CM

## 2024-09-06 DIAGNOSIS — M47.816 FACET ARTHRITIS OF LUMBAR REGION: ICD-10-CM

## 2024-09-06 NOTE — TELEPHONE ENCOUNTER
Patient was agreeable to the pain management referral. She stated to go ahead and order the referral.      MRI of her back reveals mild disc bulging and some arthritic changes that may be impinging some nerves causing her symptoms in her legs.  Would recommend consultation with pain management for consideration of injections.  Please advise if she would be agreeable.   Written by Doug Worrell MD on 9/6/2024 12:39 PM EDT

## 2024-09-06 NOTE — PROGRESS NOTES
MRI of her back reveals mild disc bulging and some arthritic changes that may be impinging some nerves causing her symptoms in her legs.  Would recommend consultation with pain management for consideration of injections.  Please advise if she would be agreeable.

## 2024-09-06 NOTE — TELEPHONE ENCOUNTER
Caller: Mallorie Dover    Relationship: Self    Best call back number: [phone]    Caller requesting test results: SELF    What test was performed: MRI    When was the test performed: 09/03/2024    Where was the test performed: [facility]     Additional notes: PATIENT SAW THE RESULTS ON MYCHART AND REQUESTING A CALL BACK TO DISCUSS THE RESULTS. PATIENT HAS QUESTIONS IN REGARDS TO THE PAIN MANAGEMENT INJECTIONS?

## 2024-09-09 NOTE — TELEPHONE ENCOUNTER
Doug Worrell MD  You3 days ago       She is on maximum dose.  I will refer to pain management   Sent MyChart message.

## 2024-09-18 ENCOUNTER — OFFICE VISIT (OUTPATIENT)
Dept: PAIN MEDICINE | Facility: CLINIC | Age: 44
End: 2024-09-18
Payer: MEDICARE

## 2024-09-18 VITALS — WEIGHT: 175 LBS | BODY MASS INDEX: 32.2 KG/M2 | HEIGHT: 62 IN

## 2024-09-18 DIAGNOSIS — M47.817 LUMBOSACRAL SPONDYLOSIS WITHOUT MYELOPATHY: Primary | ICD-10-CM

## 2024-09-18 DIAGNOSIS — Z87.898 HISTORY OF SEXUAL VIOLENCE: ICD-10-CM

## 2024-09-18 PROCEDURE — 1160F RVW MEDS BY RX/DR IN RCRD: CPT | Performed by: STUDENT IN AN ORGANIZED HEALTH CARE EDUCATION/TRAINING PROGRAM

## 2024-09-18 PROCEDURE — 99204 OFFICE O/P NEW MOD 45 MIN: CPT | Performed by: STUDENT IN AN ORGANIZED HEALTH CARE EDUCATION/TRAINING PROGRAM

## 2024-09-18 PROCEDURE — 1125F AMNT PAIN NOTED PAIN PRSNT: CPT | Performed by: STUDENT IN AN ORGANIZED HEALTH CARE EDUCATION/TRAINING PROGRAM

## 2024-09-18 PROCEDURE — G2211 COMPLEX E/M VISIT ADD ON: HCPCS | Performed by: STUDENT IN AN ORGANIZED HEALTH CARE EDUCATION/TRAINING PROGRAM

## 2024-09-18 PROCEDURE — 1159F MED LIST DOCD IN RCRD: CPT | Performed by: STUDENT IN AN ORGANIZED HEALTH CARE EDUCATION/TRAINING PROGRAM

## 2024-09-18 RX ORDER — SENNOSIDES 8.6 MG
650 CAPSULE ORAL
COMMUNITY

## 2024-09-19 ENCOUNTER — TELEPHONE (OUTPATIENT)
Dept: PAIN MEDICINE | Facility: CLINIC | Age: 44
End: 2024-09-19
Payer: MEDICARE

## 2024-09-25 ENCOUNTER — TELEPHONE (OUTPATIENT)
Dept: FAMILY MEDICINE CLINIC | Facility: CLINIC | Age: 44
End: 2024-09-25
Payer: MEDICARE

## 2024-10-17 ENCOUNTER — OFFICE VISIT (OUTPATIENT)
Dept: FAMILY MEDICINE CLINIC | Facility: CLINIC | Age: 44
End: 2024-10-17
Payer: MEDICARE

## 2024-10-17 VITALS
OXYGEN SATURATION: 97 % | HEIGHT: 62 IN | DIASTOLIC BLOOD PRESSURE: 80 MMHG | SYSTOLIC BLOOD PRESSURE: 122 MMHG | WEIGHT: 178.4 LBS | HEART RATE: 89 BPM | BODY MASS INDEX: 32.83 KG/M2

## 2024-10-17 DIAGNOSIS — G47.33 OSA ON CPAP: Primary | ICD-10-CM

## 2024-10-17 DIAGNOSIS — M47.899 FACET SYNDROME: ICD-10-CM

## 2024-10-17 PROCEDURE — 3044F HG A1C LEVEL LT 7.0%: CPT | Performed by: INTERNAL MEDICINE

## 2024-10-17 PROCEDURE — 99213 OFFICE O/P EST LOW 20 MIN: CPT | Performed by: INTERNAL MEDICINE

## 2024-10-17 PROCEDURE — 3074F SYST BP LT 130 MM HG: CPT | Performed by: INTERNAL MEDICINE

## 2024-10-17 PROCEDURE — 3079F DIAST BP 80-89 MM HG: CPT | Performed by: INTERNAL MEDICINE

## 2024-10-17 PROCEDURE — 1125F AMNT PAIN NOTED PAIN PRSNT: CPT | Performed by: INTERNAL MEDICINE

## 2024-10-17 NOTE — PROGRESS NOTES
"Mallorie Dover  1980  [id number]  Patient Care Team:  Doug Worrell MD as PCP - General (Internal Medicine)    Mallorie Dover is a 44 y.o. female here today for follow up.     This patient is accompanied by their self who contributes to the history of their care.    Chief Complaint:    Chief Complaint   Patient presents with    Sleep Apnea     Need a referral         History of Present Illness:  I have reviewed and/or updated the patient's past medical, past surgical, family, social history, problem list and allergies as appropriate.     Mallorie is here complaining of ongoing fatigue.  Wakes up unrested.Has been seeing Dr. Leung for years for MYNOR. Dr Leung has since retired. She has been under treatment with CPAP. She is in need or referral for new sleep medicine.     She has decided to not have RFA ablation - severe anxiety. And panic attack. She has decided to use Voltaren prn    Review of Systems   Constitutional:  Positive for fatigue.   Respiratory:  Positive for apnea.        Vitals:    10/17/24 1007   BP: 122/80   Pulse: 89   SpO2: 97%   Weight: 80.9 kg (178 lb 6.4 oz)   Height: 157.5 cm (62\")     Body mass index is 32.63 kg/m².    Physical Exam  Vitals and nursing note reviewed.   Constitutional:       General: She is not in acute distress.     Appearance: She is well-developed. She is not diaphoretic.   HENT:      Head: Normocephalic and atraumatic.      Right Ear: External ear normal.      Left Ear: External ear normal.      Mouth/Throat:      Pharynx: No oropharyngeal exudate.   Eyes:      General: No scleral icterus.        Right eye: No discharge.      Conjunctiva/sclera: Conjunctivae normal.   Neck:      Thyroid: No thyromegaly.      Vascular: No JVD.      Trachea: No tracheal deviation.   Cardiovascular:      Rate and Rhythm: Normal rate and regular rhythm.      Heart sounds: Normal heart sounds.      Comments: PMI nondisplaced  Pulmonary:      Effort: Pulmonary effort is " normal.      Breath sounds: Normal breath sounds. No wheezing or rales.   Abdominal:      General: Bowel sounds are normal.      Palpations: Abdomen is soft.      Tenderness: There is no abdominal tenderness. There is no guarding or rebound.   Musculoskeletal:      Cervical back: Normal range of motion and neck supple.   Lymphadenopathy:      Cervical: No cervical adenopathy.   Skin:     General: Skin is warm and dry.      Capillary Refill: Capillary refill takes less than 2 seconds.      Coloration: Skin is not pale.      Findings: No rash.   Neurological:      Mental Status: She is alert and oriented to person, place, and time.      Motor: No abnormal muscle tone.      Coordination: Coordination normal.   Psychiatric:         Judgment: Judgment normal.         Procedures    Results Review:    None    Assessment/Plan:    Problem List Items Addressed This Visit       Facet syndrome    Current Assessment & Plan     Contiue voltaren for now. Has f/u with pain management in Jan. I offerered prn medicaion to cover anxiety/panic if she chooses.           Other Visit Diagnoses       MYNOR on CPAP    -  Primary    Relevant Orders    Ambulatory Referral to Sleep Medicine            Plan of care reviewed with patient at the conclusion of today's visit. Education was provided regarding diagnosis and management.  Patient verbalizes understanding of and agreement with management plan.    Return for Next scheduled follow up.    Doug Worrell MD      Please note than portions of this note were completed [facility] a Voice Recognition Program

## 2024-10-17 NOTE — ASSESSMENT & PLAN NOTE
Contiue voltaren for now. Has f/u with pain management in Jan. I offerered prn medicaion to cover anxiety/panic if she chooses.

## 2024-10-30 RX ORDER — MELOXICAM 15 MG/1
15 TABLET ORAL DAILY
Qty: 90 TABLET | Refills: 1 | OUTPATIENT
Start: 2024-10-30

## 2024-11-01 ENCOUNTER — TELEPHONE (OUTPATIENT)
Dept: FAMILY MEDICINE CLINIC | Facility: CLINIC | Age: 44
End: 2024-11-01
Payer: MEDICARE

## 2024-11-01 DIAGNOSIS — M79.676 PAIN OF TOE, UNSPECIFIED LATERALITY: Primary | ICD-10-CM

## 2024-11-01 NOTE — TELEPHONE ENCOUNTER
Caller: Mallorie Dover    Relationship: Self    Best call back number: [phone]     What is the medical concern/diagnosis: CORN ON THE FOOT    What specialty or service is being requested: ORTHOPEDIC    What is the provider, practice or medical service name:     What is the office location:     What is the office phone number:     Any additional details: PATIENT IS CALLING TO GET A REFERRAL FOR THE FOOT DOCTOR.     THE PATIENT IS NEEDING THE CORN ON HER  FEET SHAVED DOWN.    THE PATIENT HAS BEEN WALKING WITH  AND THIS HAS IRRITATED THE CORN ON THE FOOT.    PLEASE CALL PATIENT ONCE THE REFERRAL REQUEST HAS BEEN APPROVED ANS SUBMITTED

## 2024-11-02 ENCOUNTER — TELEPHONE (OUTPATIENT)
Dept: FAMILY MEDICINE CLINIC | Facility: CLINIC | Age: 44
End: 2024-11-02
Payer: MEDICARE

## 2024-11-07 ENCOUNTER — OFFICE VISIT (OUTPATIENT)
Dept: FAMILY MEDICINE CLINIC | Facility: CLINIC | Age: 44
End: 2024-11-07
Payer: MEDICARE

## 2024-11-07 VITALS
HEIGHT: 62 IN | WEIGHT: 177.4 LBS | DIASTOLIC BLOOD PRESSURE: 82 MMHG | SYSTOLIC BLOOD PRESSURE: 126 MMHG | HEART RATE: 65 BPM | BODY MASS INDEX: 32.65 KG/M2 | OXYGEN SATURATION: 98 %

## 2024-11-07 DIAGNOSIS — Z23 IMMUNIZATION DUE: Primary | ICD-10-CM

## 2024-11-07 DIAGNOSIS — J20.9 ACUTE BRONCHITIS, UNSPECIFIED ORGANISM: ICD-10-CM

## 2024-11-07 RX ORDER — GUAIFENESIN AND DEXTROMETHORPHAN HYDROBROMIDE 600; 30 MG/1; MG/1
2 TABLET, EXTENDED RELEASE ORAL 2 TIMES DAILY PRN
Qty: 60 EACH | Refills: 2 | Status: SHIPPED | OUTPATIENT
Start: 2024-11-07

## 2024-11-07 RX ORDER — OMEPRAZOLE 40 MG/1
40 CAPSULE, DELAYED RELEASE ORAL DAILY
Qty: 90 CAPSULE | Refills: 2 | Status: SHIPPED | OUTPATIENT
Start: 2024-11-07

## 2024-11-07 RX ORDER — METHYLPREDNISOLONE 4 MG/1
TABLET ORAL
Qty: 1 EACH | Refills: 0 | Status: SHIPPED | OUTPATIENT
Start: 2024-11-07

## 2024-11-07 RX ORDER — DOXYCYCLINE 100 MG/1
100 CAPSULE ORAL 2 TIMES DAILY
Qty: 20 CAPSULE | Refills: 0 | Status: SHIPPED | OUTPATIENT
Start: 2024-11-07

## 2024-11-07 NOTE — PROGRESS NOTES
"Mallorie Dover  1980  [id number]  Patient Care Team:  Doug Worrell MD as PCP - General (Internal Medicine)    Mallorie Dover is a 44 y.o. female here today for follow up.     This patient is accompanied by their self who contributes to the history of their care.    Chief Complaint:    Chief Complaint   Patient presents with    URI     Started halloween    Cough        History of Present Illness:  I have reviewed and/or updated the patient's past medical, past surgical, family, social history, problem list and allergies as appropriate.     Mallorie presents with a problem consisting of a cough that started before Halloween evening. Has had cough productive of thick green chunks associated with chest heavienss. Doing her neb tid. Denies earache but has hada sore throat. No recent fever.  No hemoptysis.  She continues on her Advair.  Denies any earaches.  She has not tried any over-the-counter medications.  There is no chest pain.  She had recently done a nebulizer at lunchtime.    Review of Systems   Constitutional:  Positive for fatigue. Negative for fever.   Respiratory:  Positive for cough, chest tightness and shortness of breath.    Cardiovascular: Negative.    Gastrointestinal: Negative.        Vitals:    11/07/24 1438   BP: 126/82   Pulse: 65   SpO2: 98%   Weight: 80.5 kg (177 lb 6.4 oz)   Height: 157.5 cm (62.01\")     Body mass index is 32.44 kg/m².    Physical Exam  Vitals and nursing note reviewed.   Constitutional:       General: She is not in acute distress.     Appearance: She is well-developed. She is not diaphoretic.   HENT:      Head: Normocephalic and atraumatic.      Right Ear: External ear normal.      Left Ear: External ear normal.      Mouth/Throat:      Pharynx: No oropharyngeal exudate.   Eyes:      General: No scleral icterus.        Right eye: No discharge.      Conjunctiva/sclera: Conjunctivae normal.   Neck:      Thyroid: No thyromegaly.      Vascular: No JVD.      " Trachea: No tracheal deviation.   Cardiovascular:      Rate and Rhythm: Normal rate and regular rhythm.      Heart sounds: Normal heart sounds.      Comments: PMI nondisplaced  Pulmonary:      Effort: Pulmonary effort is normal.      Breath sounds: No wheezing, rhonchi or rales.      Comments: Coarse breath sounds bilaterally.  Chest:      Chest wall: No tenderness.   Abdominal:      General: Bowel sounds are normal.      Palpations: Abdomen is soft.      Tenderness: There is no abdominal tenderness. There is no guarding or rebound.   Musculoskeletal:      Cervical back: Normal range of motion and neck supple.   Lymphadenopathy:      Cervical: No cervical adenopathy.   Skin:     General: Skin is warm and dry.      Capillary Refill: Capillary refill takes less than 2 seconds.      Coloration: Skin is not pale.      Findings: No rash.   Neurological:      Mental Status: She is alert and oriented to person, place, and time.      Motor: No abnormal muscle tone.      Coordination: Coordination normal.   Psychiatric:         Judgment: Judgment normal.         Procedures    Results Review:    None    Assessment/Plan:    Problem List Items Addressed This Visit    None  Visit Diagnoses       Immunization due    -  Primary    Relevant Orders    Fluzone >6mos (Completed)    Acute bronchitis, unspecified organism        Relevant Medications    doxycycline (VIBRAMYCIN) 100 MG capsule    guaifenesin-dextromethorphan 600-30 mg (MUCINEX DM)  MG tablet sustained-release 12 hour    methylPREDNISolone (MEDROL) 4 MG dose pack    omeprazole (priLOSEC) 40 MG capsule            Plan of care reviewed with patient at the conclusion of today's visit. Education was provided regarding diagnosis and management.  Patient verbalizes understanding of and agreement with management plan.    Return if symptoms worsen or fail to improve, for Next scheduled follow up.    Doug Worrell MD      Please note than portions of this note were  completed [facility] a Voice Recognition Program

## 2024-12-03 ENCOUNTER — OFFICE VISIT (OUTPATIENT)
Dept: FAMILY MEDICINE CLINIC | Facility: CLINIC | Age: 44
End: 2024-12-03
Payer: MEDICARE

## 2024-12-03 VITALS
DIASTOLIC BLOOD PRESSURE: 76 MMHG | SYSTOLIC BLOOD PRESSURE: 149 MMHG | WEIGHT: 182.9 LBS | BODY MASS INDEX: 33.66 KG/M2 | HEART RATE: 74 BPM | HEIGHT: 62 IN | OXYGEN SATURATION: 98 %

## 2024-12-03 DIAGNOSIS — J20.9 ACUTE BRONCHITIS, UNSPECIFIED ORGANISM: Primary | ICD-10-CM

## 2024-12-03 DIAGNOSIS — J45.31 MILD PERSISTENT ASTHMA WITH ACUTE EXACERBATION: ICD-10-CM

## 2024-12-03 PROBLEM — C80.1 MICROPAPILLARY CARCINOMA: Status: ACTIVE | Noted: 2024-09-10

## 2024-12-03 PROBLEM — D34 FOLLICULAR ADENOMA OF THYROID GLAND: Status: ACTIVE | Noted: 2024-09-10

## 2024-12-03 LAB
EXPIRATION DATE: NORMAL
FLUAV AG UPPER RESP QL IA.RAPID: NOT DETECTED
FLUBV AG UPPER RESP QL IA.RAPID: NOT DETECTED
INTERNAL CONTROL: NORMAL
Lab: NORMAL
SARS-COV-2 AG UPPER RESP QL IA.RAPID: NOT DETECTED

## 2024-12-03 PROCEDURE — 3078F DIAST BP <80 MM HG: CPT | Performed by: FAMILY MEDICINE

## 2024-12-03 PROCEDURE — 99214 OFFICE O/P EST MOD 30 MIN: CPT | Performed by: FAMILY MEDICINE

## 2024-12-03 PROCEDURE — 1125F AMNT PAIN NOTED PAIN PRSNT: CPT | Performed by: FAMILY MEDICINE

## 2024-12-03 PROCEDURE — 87428 SARSCOV & INF VIR A&B AG IA: CPT | Performed by: FAMILY MEDICINE

## 2024-12-03 PROCEDURE — 1159F MED LIST DOCD IN RCRD: CPT | Performed by: FAMILY MEDICINE

## 2024-12-03 PROCEDURE — 3077F SYST BP >= 140 MM HG: CPT | Performed by: FAMILY MEDICINE

## 2024-12-03 PROCEDURE — 1160F RVW MEDS BY RX/DR IN RCRD: CPT | Performed by: FAMILY MEDICINE

## 2024-12-03 PROCEDURE — 3044F HG A1C LEVEL LT 7.0%: CPT | Performed by: FAMILY MEDICINE

## 2024-12-03 RX ORDER — DOXYCYCLINE 100 MG/1
100 CAPSULE ORAL 2 TIMES DAILY
Qty: 20 CAPSULE | Refills: 0 | Status: SHIPPED | OUTPATIENT
Start: 2024-12-03 | End: 2024-12-09

## 2024-12-03 RX ORDER — METHYLPREDNISOLONE 4 MG/1
TABLET ORAL
Qty: 1 EACH | Refills: 0 | Status: SHIPPED | OUTPATIENT
Start: 2024-12-03

## 2024-12-03 RX ORDER — FLUTICASONE PROPIONATE AND SALMETEROL 50; 250 UG/1; UG/1
1 POWDER RESPIRATORY (INHALATION)
Qty: 1 EACH | Refills: 9 | Status: SHIPPED | OUTPATIENT
Start: 2024-12-03

## 2024-12-03 RX ORDER — ALBUTEROL SULFATE 90 UG/1
2 INHALANT RESPIRATORY (INHALATION) AS NEEDED
Qty: 18 G | Refills: 5 | Status: SHIPPED | OUTPATIENT
Start: 2024-12-03

## 2024-12-03 NOTE — ASSESSMENT & PLAN NOTE
Asthma is stable.  The patient is experiencing monthly daytime asthma symptoms and she is experiencing no nighttime asthma symptoms.    Plan: Continue same medication/s without change.    Discussed medication dosage, use, side effects, and goals of treatment in detail.    Discussed distinction between quick-relief and maintenance control medications.  Discussed monitoring symptoms and use of quick-relief medications and contacting provider early in the course of exacerbations.  Warning signs of respiratory distress were reviewed with the patient.     Patient Treatment Goals: symptom prevention, minimizing limitation in activity, prevention of exacerbations and use of ER/inpatient care, maintenance of optimal pulmonary function, and minimization of adverse effects of treatment.    Followup at the next regular appointment.

## 2024-12-03 NOTE — ASSESSMENT & PLAN NOTE
Will start doxycycline, Medrol Dosepak, and refill a butyryl inhaler regimen. . Recommended supportive care, adequate hydration, and stressed the importance of healthy hygiene habits to avoid spread (I.e. hand washing, social distancing) Advised patient to call clinic if they develop fever or if symptoms worsen/persist.

## 2024-12-03 NOTE — PROGRESS NOTES
Office Note     Name: Mallorie Dover    : 1980     MRN: [id number]     Chief Complaint  Cough (Ongoing since  ) and URI (Pt states she has a lot of chest congestion and her chest feels heavy )    Subjective     History of Present Illness:  Mallorie Dover is a 44 y.o. female who presents today for upper respiratory infection.    Patient notes that for the past 5 days she has had increased chest congestion, cough, chest heaviness.  She notes that she does have a history of asthma and will have recurrent chest infections.  She [facility] and she did well up with a course of antibiotics and steroid.  Patient notes her symptoms are very similar to that.  She notes that she is having some myalgias but denies any fevers.  She denies any significant shortness of breath.  She is requesting a refill on her inhaler regimen.    Patient is due for diabetic testing today, however she declines as she is not feeling well.    Review of Systems:   Review of Systems   Constitutional:  Negative for fever.   HENT:  Positive for congestion.    Respiratory:  Positive for cough and chest tightness.    Musculoskeletal:  Positive for myalgias.   All other systems reviewed and are negative.      Past Medical History:   Past Medical History:   Diagnosis Date    Arthritis     Asthma     Cancer     GERD (gastroesophageal reflux disease)     Hypothyroidism     Spondylosis        Past Surgical History:   Past Surgical History:   Procedure Laterality Date    GASTRIC BYPASS OPEN         Family History:   Family History   Problem Relation Age of Onset    Ovarian cancer Mother     Breast cancer Mother     Mental illness Mother     Hyperlipidemia Mother     Hypertension Mother     Heart disease Mother     Diabetes Mother     Cancer Mother     Arthritis Mother     Migraines Mother     Tuberculosis Mother     Ovarian cancer Maternal Grandmother     Breast cancer Maternal Grandmother         Social History:   Social History     Socioeconomic History    Marital status:    Tobacco Use    Smoking status: Former     Current packs/day: 0.00     Average packs/day: 1 pack/day for 35.7 years (35.7 ttl pk-yrs)     Types: Cigarettes     Start date: 1985     Quit date: 2020     Years since quittin.2    Smokeless tobacco: Never   Vaping Use    Vaping status: Never Used   Substance and Sexual Activity    Alcohol use: Not Currently    Drug use: Never    Sexual activity: Defer       Immunizations:   Immunization History   Administered Date(s) Administered    COVID-19 (PFIZER) 12YRS+ (COMIRNATY) 10/02/2023    COVID-19 (UNSPECIFIED) 2021, 2021, 2022    Fluzone  >6mos 2024    Influenza Injectable Mdck Pf Quad 10/02/2023    Pneumococcal Polysaccharide (PPSV23) 2020    Tdap 2014        Medications:     Current Outpatient Medications:     acetaminophen (TYLENOL) 650 MG 8 hr tablet, Take 1 tablet by mouth., Disp: , Rfl:     Advair Diskus 250-50 MCG/ACT DISKUS, Inhale 1 puff 2 (Two) Times a Day., Disp: 1 each, Rfl: 9    albuterol sulfate  (90 Base) MCG/ACT inhaler, Inhale 2 puffs As Needed for Wheezing., Disp: 18 g, Rfl: 5    azelastine (ASTELIN) 0.1 % nasal spray, Administer 2 sprays into the nostril(s) as directed by provider 2 (Two) Times a Day., Disp: , Rfl:     calamine lotion, Apply  topically to the appropriate area as directed 3 (Three) Times a Day As Needed for Itching (itching)., Disp: 180 mL, Rfl: 0    cetirizine (zyrTEC) 10 MG tablet, Take 1 tablet by mouth Daily., Disp: , Rfl:     doxycycline (VIBRAMYCIN) 100 MG capsule, Take 1 capsule by mouth 2 (Two) Times a Day., Disp: 20 capsule, Rfl: 0    fluticasone (FLONASE) 50 MCG/ACT nasal spray, 2 sprays by Each Nare route Daily. Shake well before using., Disp: 48 g, Rfl: 2    guaifenesin-dextromethorphan 600-30 mg (MUCINEX DM)  MG tablet sustained-release 12 hour, Take 2 tablets by mouth 2 (Two)  "Times a Day As Needed (cough)., Disp: 60 each, Rfl: 2    meloxicam (Mobic) 15 MG tablet, Take 1 tablet by mouth Daily., Disp: 90 tablet, Rfl: 1    methylPREDNISolone (MEDROL) 4 MG dose pack, Take as directed on package instructions., Disp: 1 each, Rfl: 0    multivitamin with minerals (MULTI FOR HER PO), Take 1 tablet by mouth Daily., Disp: , Rfl:     omeprazole (priLOSEC) 40 MG capsule, Take 1 capsule by mouth Daily., Disp: 90 capsule, Rfl: 2    ondansetron ODT (ZOFRAN-ODT) 4 MG disintegrating tablet, Place 1 tablet on the tongue Every 8 (Eight) Hours As Needed for Nausea or Vomiting., Disp: 6 tablet, Rfl: 0    tiZANidine (ZANAFLEX) 2 MG tablet, Take 1 tablet by mouth Every 12 (Twelve) Hours As Needed for Muscle Spasms., Disp: 180 tablet, Rfl: 0    valACYclovir (VALTREX) 1000 MG tablet, Take 0.5 tablets by mouth Daily., Disp: 14 tablet, Rfl: 0    Allergies:   Allergies   Allergen Reactions    Bupropion Hives and Unknown - Low Severity     blisters    Seroquel [Quetiapine] Swelling    Gabapentin Rash       Objective     Vital Signs  /76   Pulse 74   Ht 157.5 cm (62.01\")   Wt 83 kg (182 lb 14.4 oz)   SpO2 98%   BMI 33.44 kg/m²   Estimated body mass index is 33.44 kg/m² as calculated from the following:    Height as of this encounter: 157.5 cm (62.01\").    Weight as of this encounter: 83 kg (182 lb 14.4 oz).         Physical Exam  Vitals and nursing note reviewed.   Constitutional:       Appearance: Normal appearance. She is normal weight.   HENT:      Head: Normocephalic and atraumatic.      Nose: Nose normal.      Mouth/Throat:      Mouth: Mucous membranes are moist.   Eyes:      Extraocular Movements: Extraocular movements intact.      Pupils: Pupils are equal, round, and reactive to light.   Cardiovascular:      Rate and Rhythm: Normal rate.   Pulmonary:      Effort: Pulmonary effort is normal.      Breath sounds: Wheezing and rhonchi present.   Abdominal:      General: Abdomen is flat. "   Musculoskeletal:         General: Normal range of motion.      Cervical back: Normal range of motion.   Skin:     General: Skin is warm.   Neurological:      General: No focal deficit present.      Mental Status: She is alert and oriented to person, place, and time.   Psychiatric:         Mood and Affect: Mood normal.         Behavior: Behavior normal.         Thought Content: Thought content normal.         Judgment: Judgment normal.            Procedures     Results:  Recent Results (from the past 24 hours)   POCT SARS-CoV-2 Antigen ABEL + Flu    Collection Time: 12/03/24  2:38 PM    Specimen: Swab   Result Value Ref Range    SARS Antigen Not Detected Not Detected, Presumptive Negative    Influenza A Antigen ABEL Not Detected Not Detected    Influenza B Antigen ABEL Not Detected Not Detected    Internal Control Passed Passed    Lot Number [id number]     Expiration Date 09/04/2025         Assessment and Plan     Assessment/Plan:  Diagnoses and all orders for this visit:    1. Acute bronchitis, unspecified organism (Primary)  Assessment & Plan:  Will start doxycycline, Medrol Dosepak, and refill a butyryl inhaler regimen. . Recommended supportive care, adequate hydration, and stressed the importance of healthy hygiene habits to avoid spread (I.e. hand washing, social distancing) Advised patient to call clinic if they develop fever or if symptoms worsen/persist.       Orders:  -     POCT SARS-CoV-2 Antigen ABEL + Flu  -     albuterol sulfate  (90 Base) MCG/ACT inhaler; Inhale 2 puffs As Needed for Wheezing.  Dispense: 18 g; Refill: 5  -     Advair Diskus 250-50 MCG/ACT DISKUS; Inhale 1 puff 2 (Two) Times a Day.  Dispense: 1 each; Refill: 9  -     doxycycline (VIBRAMYCIN) 100 MG capsule; Take 1 capsule by mouth 2 (Two) Times a Day.  Dispense: 20 capsule; Refill: 0  -     methylPREDNISolone (MEDROL) 4 MG dose pack; Take as directed on package instructions.  Dispense: 1 each; Refill: 0    2. Mild persistent asthma  with acute exacerbation  Assessment & Plan:  Asthma is stable.  The patient is experiencing monthly daytime asthma symptoms and she is experiencing no nighttime asthma symptoms.    Plan: Continue same medication/s without change.    Discussed medication dosage, use, side effects, and goals of treatment in detail.    Discussed distinction between quick-relief and maintenance control medications.  Discussed monitoring symptoms and use of quick-relief medications and contacting provider early in the course of exacerbations.  Warning signs of respiratory distress were reviewed with the patient.     Patient Treatment Goals: symptom prevention, minimizing limitation in activity, prevention of exacerbations and use of ER/inpatient care, maintenance of optimal pulmonary function, and minimization of adverse effects of treatment.    Followup at the next regular appointment.                  Follow Up  Return if symptoms worsen or fail to improve.    Belén Ravi DO   [facility]

## 2024-12-27 ENCOUNTER — APPOINTMENT (OUTPATIENT)
Dept: GENERAL RADIOLOGY | Facility: HOSPITAL | Age: 44
End: 2024-12-27
Payer: MEDICARE

## 2024-12-27 ENCOUNTER — HOSPITAL ENCOUNTER (EMERGENCY)
Facility: HOSPITAL | Age: 44
Discharge: HOME | End: 2024-12-27
Attending: EMERGENCY MEDICINE
Payer: MEDICARE

## 2024-12-27 VITALS
RESPIRATION RATE: 16 BRPM | WEIGHT: 178 LBS | TEMPERATURE: 98.3 F | OXYGEN SATURATION: 100 % | HEART RATE: 71 BPM | BODY MASS INDEX: 31.54 KG/M2 | DIASTOLIC BLOOD PRESSURE: 74 MMHG | HEIGHT: 63 IN | SYSTOLIC BLOOD PRESSURE: 174 MMHG
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
## 2024-12-27 DIAGNOSIS — S80.02XA CONTUSION OF LEFT KNEE, INITIAL ENCOUNTER: ICD-10-CM

## 2024-12-27 DIAGNOSIS — S63.502A SPRAIN OF LEFT WRIST, INITIAL ENCOUNTER: Primary | ICD-10-CM

## 2024-12-27 PROCEDURE — 73560 X-RAY EXAM OF KNEE 1 OR 2: CPT

## 2024-12-27 PROCEDURE — 73110 X-RAY EXAM OF WRIST: CPT

## 2024-12-27 PROCEDURE — 99283 EMERGENCY DEPT VISIT LOW MDM: CPT

## 2024-12-27 NOTE — ED PROVIDER NOTES
EMERGENCY DEPARTMENT ENCOUNTER    Pt Name: Mallorie Dover  MRN: [id number]  Pt :   1980  Room Number:  RW2/R2  Date of encounter:  2024  PCP: Doug Worrell MD  ED Provider: RONNI Godwin    Historian: Patient    HPI:  Chief Complaint:  fall, Lt knee pain, Lt wrist pain    Context: Mallorie Dover is a 44 y.o. female who presents to the ED c/o lt knee pain, lt wrist pain following a fall.  Patient explains that she tripped on the uneven concrete.  She went down on her bilateral hands trying to break her fall.  She landed on her left knee.  Patient has abrasions to her left knee.  She has some swelling to the knee.  She advises she is keeping the knee clean with Betadine.  Patient denies hitting her head or any loss of consciousness.  Patient does have some tenderness to her left wrist.  HPI     REVIEW OF SYSTEMS  A chief complaint appropriate review of systems was completed and is negative except as noted in the HPI.     PAST MEDICAL HISTORY  Past Medical History:   Diagnosis Date    Arthritis     Asthma     Bipolar disorder     Carpal tunnel syndrome     Chronic obstructive pulmonary disease (COPD)     GERD (gastroesophageal reflux disease)     History of abnormal cervical Pap smear     History of diabetes mellitus     prior to gastric bypass    History of hypertension     prior to gastric bypass    History of shingles 2023    History of thyroid cancer     Hypothyroidism, postsurgical     Obstructive sleep apnea     PCOS (polycystic ovarian syndrome)     PTSD (post-traumatic stress disorder)     Spondylosis        PAST SURGICAL HISTORY  Past Surgical History:   Procedure Laterality Date    BREAST BIOPSY  2015    CERVICAL POLYPECTOMY  2021    GASTRIC BYPASS  2021    olga lidia-en-y    THYROIDECTOMY, PARTIAL  2015    right, isthmus       FAMILY HISTORY  Family History   Problem Relation Age of Onset    Ovarian cancer Mother     Breast cancer Mother      Mental illness Mother     Hyperlipidemia Mother     Hypertension Mother     Heart disease Mother     Diabetes Mother     Colon cancer Mother     Arthritis Mother     Migraines Mother     Tuberculosis Mother     Ovarian cancer Maternal Grandmother     Breast cancer Maternal Grandmother        SOCIAL HISTORY  Social History     Socioeconomic History    Marital status:    Tobacco Use    Smoking status: Former     Current packs/day: 0.00     Average packs/day: 1 pack/day for 35.7 years (35.7 ttl pk-yrs)     Types: Cigarettes     Start date: 1985     Quit date: 2020     Years since quittin.3    Smokeless tobacco: Never   Vaping Use    Vaping status: Never Used   Substance and Sexual Activity    Alcohol use: Not Currently    Drug use: Never    Sexual activity: Defer       ALLERGIES  Bupropion, Seroquel [quetiapine], and Gabapentin    PHYSICAL EXAM  Physical Exam  Vitals and nursing note reviewed.   Constitutional:       General: She is not in acute distress.     Appearance: Normal appearance. She is not ill-appearing.   HENT:      Head: Normocephalic and atraumatic.      Nose: Nose normal.      Mouth/Throat:      Mouth: Mucous membranes are moist.   Eyes:      Extraocular Movements: Extraocular movements intact.      Conjunctiva/sclera: Conjunctivae normal.   Cardiovascular:      Rate and Rhythm: Normal rate.      Pulses: Normal pulses.      Heart sounds: Normal heart sounds.   Pulmonary:      Effort: Pulmonary effort is normal. No respiratory distress.      Breath sounds: Normal breath sounds.   Musculoskeletal:      Right wrist: No swelling, deformity, tenderness or snuff box tenderness. Normal range of motion. Normal pulse.      Left wrist: Swelling and tenderness present. No deformity or snuff box tenderness. Normal range of motion. Normal pulse.      Cervical back: Normal and normal range of motion.      Thoracic back: Normal.      Lumbar back: Normal.      Left knee: Swelling present. Normal range  of motion. Tenderness (abrasion to anterior knee) present.   Skin:     General: Skin is warm and dry.   Neurological:      General: No focal deficit present.      Mental Status: She is oriented to person, place, and time.   Psychiatric:         Mood and Affect: Mood normal.         Behavior: Behavior normal.           LAB RESULTS  Results for orders placed or performed in visit on 12/03/24   POCT SARS-CoV-2 Antigen ABEL + Flu    Collection Time: 12/03/24  2:38 PM    Specimen: Swab   Result Value Ref Range    SARS Antigen Not Detected Not Detected, Presumptive Negative    Influenza A Antigen ABEL Not Detected Not Detected    Influenza B Antigen ABEL Not Detected Not Detected    Internal Control Passed Passed    Lot Number [id number]     Expiration Date 09/04/2025        If labs were ordered, I independently reviewed the results and considered them in treating the patient.    RADIOLOGY  XR Wrist 3+ View Left   Final Result   Impression:   Negative         Electronically Signed: Syd Caba     12/27/2024 5:52 PM EST     Workstation ID: [id number]      XR Knee 1 or 2 View Left   Final Result   Impression:   Negative         Electronically Signed: Syd Caba     12/27/2024 5:53 PM EST     Workstation ID: [id number]        [] Radiologist's Report Reviewed:  I ordered and independently interpreted the above noted radiographic studies.  See radiologist's dictation for official interpretation.      PROCEDURES    Procedures    No orders to display       MEDICATIONS GIVEN IN ER    Medications - No data to display    MEDICAL DECISION MAKING, PROGRESS, and CONSULTS   Medical Decision Making  Mallorie Dover is a 44 y.o. female who presents to the ED c/o lt knee pain, lt wrist pain following a fall.  Patient explains that she tripped on the uneven concrete.  She went down on her bilateral hands trying to break her fall.  She landed on her left knee.  Patient has abrasions to her left knee.  She has some swelling to the knee.   She advises she is keeping the knee clean with Betadine.  Patient denies hitting her head or any loss of consciousness.  Patient does have some tenderness to her left wrist.      Problems Addressed:  Contusion of left knee, initial encounter: complicated acute illness or injury     Details: Lt knee imaging is negative.    Pt to rest, ice, compression and elevate extremity.   Fall on same level from slipping, tripping and stumbling with subsequent striking against other object, initial encounter: self-limited or minor problem  Sprain of left wrist, initial encounter: complicated acute illness or injury     Details: Left wrist imaging is interpreted as negative.  I encourage patient to rest, ice, compression elevate extremity.  Patient to wear wrist brace.    Amount and/or Complexity of Data Reviewed  Radiology: ordered. Decision-making details documented in ED Course.        Discussion below represents my analysis of pertinent findings related to patient's condition, differential diagnosis, treatment plan and final disposition.    Differential diagnosis: Fracture, dislocation, sprain, strain, contusion  Additional differential diagnosis include but are not limited to:     Additional sources  Discussed/ obtained information from independent historians:   [] Spouse  [] Parent  [] Family member  [] Friend  [] EMS   [] Other:  External (non-ED) record review:   [] Inpatient record:   [] Office record:   [] Outpatient record:   [x] Prior Outpatient labs:   [x] Prior Outpatient radiology:   [] Primary Care record:   [] Outside ED record:   [] Other:   Patient's care impacted by:   [] Diabetes  [x] Hypertension  [] Hyperlipidemia  [x] Hypothyroidism   [] Coronary Artery Disease  [] Congestive Heart Failure   [] COPD   [] Cancer   [] Obesity  [x] GERD   [] Tobacco Abuse   [] Substance Abuse    [] Anxiety   [] Depression   [] Other:   Care significantly affected by Social Determinants of Health (housing and economic  circumstances, unemployment)    [] Yes     [x] No   If yes, Patient's care significantly limited by  Social Determinants of Health including:   [] Inadequate housing   [] Low income   [] Alcoholism and drug addiction in family   [] Problems related to primary support group   [] Unemployment   [] Problems related to employment   [] Other Social Determinants of Health:   Shared decision making: Shared decision making with patient imaging is negative for acute fracture, dislocation.  Patient to wear left wrist brace.  Patient to rest, ice, compression elevate extremity.  Patient to alternate Tylenol and ibuprofen.    Orders placed during this visit:  Orders Placed This Encounter   Procedures    [facility] 06.  Wrist Brace    XR Knee 1 or 2 View Left    XR Wrist 3+ View Left       I considered prescription management  with:   [] Pain medication  [] Antiviral  [] Antibiotic   [] Other:   Rationale:  Additional orders considered but not ordered:  The following testing was considered but ultimately not selected after discussion with patient/family:  ED Course:    ED Course as of 12/27/24 2036   Fri Dec 27, 2024   1805 Left knee and left wrist x-rays interpreted as negative for acute findings.   [KG]      ED Course User Index  [KG] Lali Claudio, RONNI            DIAGNOSIS  Final diagnoses:   Sprain of left wrist, initial encounter   Contusion of left knee, initial encounter   Fall on same level from slipping, tripping and stumbling with subsequent striking against other object, initial encounter       DISPOSITION    DISCHARGE    Patient discharged in stable condition.    Reviewed implications of results, diagnosis, meds, responsibility to follow up, warning signs and symptoms of possible worsening, potential complications and reasons to return to ER.    Patient/Family voiced understanding of above instructions.    Discussed plan for discharge, as there is no emergent indication for admission.  Pt/family is  agreeable and understands need for follow up and possible repeat testing.  Pt/family is aware that discharge does not mean that nothing is wrong but that it indicates no emergency is currently present that requires admission and they must continue care with follow-up as given below or with a physician of their choice.     FOLLOW-UP  Doug Worrell MD  [address] [facility] [id number] [phone]               Medication List      No changes were made to your prescriptions during this visit.          ED Disposition       ED Disposition   Discharge    Condition   Stable    Comment   --               Please note that portions of this document were completed with voice recognition software.       Lali Claudio, APRN  12/27/24 3325

## 2024-12-27 NOTE — DISCHARGE INSTRUCTIONS
Rest, ice, compression elevate extremity.      Wear wrist splint.    Alternate Tylenol and ibuprofen for pain.

## 2025-01-09 ENCOUNTER — HOSPITAL ENCOUNTER (OUTPATIENT)
Dept: GENERAL RADIOLOGY | Facility: HOSPITAL | Age: 45
Discharge: HOME | End: 2025-01-09
Admitting: INTERNAL MEDICINE
Payer: MEDICARE

## 2025-01-09 ENCOUNTER — OFFICE VISIT (OUTPATIENT)
Dept: FAMILY MEDICINE CLINIC | Facility: CLINIC | Age: 45
End: 2025-01-09
Payer: MEDICARE

## 2025-01-09 VITALS
HEIGHT: 63 IN | DIASTOLIC BLOOD PRESSURE: 80 MMHG | SYSTOLIC BLOOD PRESSURE: 168 MMHG | WEIGHT: 182.4 LBS | HEART RATE: 67 BPM | BODY MASS INDEX: 32.32 KG/M2 | OXYGEN SATURATION: 98 %

## 2025-01-09 DIAGNOSIS — M79.642 LEFT HAND PAIN: ICD-10-CM

## 2025-01-09 DIAGNOSIS — M79.641 RIGHT HAND PAIN: Primary | ICD-10-CM

## 2025-01-09 DIAGNOSIS — M25.512 ACUTE PAIN OF LEFT SHOULDER: ICD-10-CM

## 2025-01-09 DIAGNOSIS — M79.641 RIGHT HAND PAIN: ICD-10-CM

## 2025-01-09 PROCEDURE — 73130 X-RAY EXAM OF HAND: CPT

## 2025-01-09 PROCEDURE — 73030 X-RAY EXAM OF SHOULDER: CPT

## 2025-01-09 RX ORDER — MELOXICAM 15 MG/1
15 TABLET ORAL DAILY
Qty: 90 TABLET | Refills: 1 | Status: SHIPPED | OUTPATIENT
Start: 2025-01-09

## 2025-01-09 NOTE — PROGRESS NOTES
"Mallorie Dover  1980  [id number]  Patient Care Team:  Doug Worrell MD as PCP - General (Internal Medicine)    Mallorie Dover is a 44 y.o. female here today for follow up.     This patient is accompanied by their self who contributes to the history of their care.    Chief Complaint:    Chief Complaint   Patient presents with    Hospital Follow Up Visit    Shoulder Pain     Lt shoulder        History of Present Illness:  I have reviewed and/or updated the patient's past medical, past surgical, family, social history, problem list and allergies as appropriate.     44 y.o. female who presented  to the ED 12/27/2024  c/o lt knee pain,as well as wrist pain following a fall.  Patient explains that she tripped on the uneven concrete.  She went down on her bilateral hands trying to break her fall.  She landed on her left knee.  Patient has abrasions to her left knee.  She has some swelling to the knee.  She advises she is keeping the knee clean with Betadine.  Patient denies hitting her head or any loss of consciousness.  Patient does have some tenderness to her left wrist.  Her shoulder and right wrist was not xrayed.  Left knee is better.    Left wrist stillpainful. Applies ice and is using a brace. Pain is at the base of her tumb - palmar surface- and into her wrist. Her rigth thumb base on right is equally painful. No nsaids taken    Left shoulder is painful in in the posterior region. Pian is worse with ir/er abduction/elevation.        Review of Systems   Constitutional: Negative.    HENT: Negative.     Musculoskeletal:  Positive for arthralgias.        Left shoulder        Vitals:    01/09/25 1426   BP: 168/80   Pulse: 67   SpO2: 98%   Weight: 82.7 kg (182 lb 6.4 oz)   Height: 160 cm (62.99\")   PainSc:   9   PainLoc: Wrist     Body mass index is 32.32 kg/m².    Physical Exam  Vitals and nursing note reviewed.   Constitutional:       General: She is not in acute distress.     Appearance: She is " well-developed. She is not diaphoretic.   HENT:      Head: Normocephalic and atraumatic.      Right Ear: External ear normal.      Left Ear: External ear normal.      Mouth/Throat:      Pharynx: No oropharyngeal exudate.   Eyes:      General: No scleral icterus.        Right eye: No discharge.      Conjunctiva/sclera: Conjunctivae normal.   Neck:      Thyroid: No thyromegaly.      Vascular: No JVD.      Trachea: No tracheal deviation.   Cardiovascular:      Rate and Rhythm: Normal rate and regular rhythm.      Heart sounds: Normal heart sounds.      Comments: PMI nondisplaced  Pulmonary:      Effort: Pulmonary effort is normal.      Breath sounds: Normal breath sounds. No wheezing or rales.   Abdominal:      General: Bowel sounds are normal.      Palpations: Abdomen is soft.      Tenderness: There is no abdominal tenderness. There is no guarding or rebound.   Musculoskeletal:      Cervical back: Normal range of motion and neck supple.      Comments: Pain with left wrist extension TTP snuff box. [place] neg bilat.  No swelling or erythema/   Left shoulder ttp a/c joint. Pain with er.ir abd/elevation and crossover   Lymphadenopathy:      Cervical: No cervical adenopathy.   Skin:     General: Skin is warm and dry.      Capillary Refill: Capillary refill takes less than 2 seconds.      Coloration: Skin is not pale.      Findings: No rash.   Neurological:      Mental Status: She is alert and oriented to person, place, and time.      Motor: No abnormal muscle tone.      Coordination: Coordination normal.   Psychiatric:         Mood and Affect: Mood normal.         Behavior: Behavior normal.         Judgment: Judgment normal.         Procedures    Results Review:    Xrays    XR WRIST 3+ VW LEFT     Date of Exam: 12/27/2024 5:27 PM EST     Indication: lt wrist pain     Comparison: None available.     Findings:  No evidence of fracture. No bony or joint abnormality     IMPRESSION:  Impression:  Negative    Narrative &  Impression   XR KNEE 1 OR 2 VW LEFT     Date of Exam: 12/27/2024 5:27 PM EST     Indication: left knee pain     Comparison: None available.     Findings:  No evidence of fracture. No bony or joint abnormality. No fluid in the suprapatellar bursa     IMPRESSION:  Impression:  Negative        Assessment/Plan:    Problem List Items Addressed This Visit       Acute pain of left shoulder    Current Assessment & Plan     This after ground-level fall landing on her outstretched hands.         Relevant Orders    XR Shoulder 2+ View Left    Ambulatory Referral to Physical Therapy for Evaluation & Treatment     Other Visit Diagnoses       Right hand pain    -  Primary    Relevant Orders    XR Hand 3+ View Right    Ambulatory Referral to Physical Therapy for Evaluation & Treatment    Left hand pain        Relevant Orders    Ambulatory Referral to Physical Therapy for Evaluation & Treatment        I suspect right sided sprain however with her tenderness over left anatomical snuffbox I would like her to see hand Ortho.  Will start with her shoulder with physical therapy.  She can continue meloxicam.  Recommend continuing ice 20 minutes on 20 minutes off both hands.  Continue splinting on the left as she is doing    Plan of care reviewed with patient at the conclusion of today's visit. Education was provided regarding diagnosis and management.  Patient verbalizes understanding of and agreement with management plan.    No follow-ups on file.    Doug Worrell MD      Please note than portions of this note were completed [facility] a Voice Recognition Program

## 2025-01-10 ENCOUNTER — PATIENT MESSAGE (OUTPATIENT)
Dept: FAMILY MEDICINE CLINIC | Facility: CLINIC | Age: 45
End: 2025-01-10
Payer: MEDICARE

## 2025-01-13 ENCOUNTER — APPOINTMENT (OUTPATIENT)
Dept: GENERAL RADIOLOGY | Facility: HOSPITAL | Age: 45
End: 2025-01-13
Payer: MEDICARE

## 2025-01-13 ENCOUNTER — APPOINTMENT (OUTPATIENT)
Dept: CT IMAGING | Facility: HOSPITAL | Age: 45
End: 2025-01-13
Payer: MEDICARE

## 2025-01-13 ENCOUNTER — HOSPITAL ENCOUNTER (EMERGENCY)
Facility: HOSPITAL | Age: 45
Discharge: HOME | End: 2025-01-13
Attending: EMERGENCY MEDICINE | Admitting: EMERGENCY MEDICINE
Payer: MEDICARE

## 2025-01-13 ENCOUNTER — TELEPHONE (OUTPATIENT)
Dept: SLEEP MEDICINE | Age: 45
End: 2025-01-13
Payer: MEDICARE

## 2025-01-13 VITALS
TEMPERATURE: 97.5 F | BODY MASS INDEX: 31.89 KG/M2 | HEIGHT: 63 IN | HEART RATE: 67 BPM | DIASTOLIC BLOOD PRESSURE: 87 MMHG | RESPIRATION RATE: 14 BRPM | WEIGHT: 180 LBS | SYSTOLIC BLOOD PRESSURE: 162 MMHG | OXYGEN SATURATION: 94 %

## 2025-01-13 DIAGNOSIS — S13.9XXA NECK SPRAIN, INITIAL ENCOUNTER: Primary | ICD-10-CM

## 2025-01-13 DIAGNOSIS — S70.02XA CONTUSION OF LEFT HIP, INITIAL ENCOUNTER: ICD-10-CM

## 2025-01-13 DIAGNOSIS — S93.402A SPRAIN OF LEFT ANKLE, UNSPECIFIED LIGAMENT, INITIAL ENCOUNTER: ICD-10-CM

## 2025-01-13 DIAGNOSIS — E04.1 THYROID NODULE: ICD-10-CM

## 2025-01-13 DIAGNOSIS — S23.9XXA THORACIC SPRAIN: ICD-10-CM

## 2025-01-13 PROCEDURE — 73610 X-RAY EXAM OF ANKLE: CPT

## 2025-01-13 PROCEDURE — 73502 X-RAY EXAM HIP UNI 2-3 VIEWS: CPT

## 2025-01-13 PROCEDURE — 72125 CT NECK SPINE W/O DYE: CPT

## 2025-01-13 PROCEDURE — 72128 CT CHEST SPINE W/O DYE: CPT

## 2025-01-13 PROCEDURE — 99284 EMERGENCY DEPT VISIT MOD MDM: CPT

## 2025-01-13 RX ORDER — TIZANIDINE 2 MG/1
2 TABLET ORAL EVERY 12 HOURS PRN
Qty: 180 TABLET | Refills: 0 | Status: SHIPPED | OUTPATIENT
Start: 2025-01-13

## 2025-01-13 RX ORDER — METHYLPREDNISOLONE 4 MG/1
TABLET ORAL
Qty: 1 EACH | Refills: 0 | Status: SHIPPED | OUTPATIENT
Start: 2025-01-13 | End: 2025-01-16

## 2025-01-13 NOTE — TELEPHONE ENCOUNTER
I called patient to inquire where she gets her PAP machine supplies from. She states she has been using [facility] [employer] [facility]. I will call them.

## 2025-01-13 NOTE — ED PROVIDER NOTES
Subjective   History of Present Illness  Pt is a 45 yo female presenting to ED with complaints of neck and back pain after a fall. PMHx significant for Thyroid cancer, HTN, Hypothyroidism, PTSD, PCOS, Bipolar, COPD, MYNOR and Asthma. Pt complains of neck, upper back, left hip and left ankle pain. She fell 12-27-24 and has had prior xray so wrists / hands but reports no imaging of neck / back or left leg. She denies taking blood thinners. Denies headache or dizziness. She weakness or numbness to extremities. Denies tobacco, drug or ETOH use.     History provided by:  Patient and medical records      Review of Systems   Constitutional:  Negative for fever.   Eyes:  Negative for visual disturbance.   Respiratory:  Negative for cough and shortness of breath.    Cardiovascular:  Negative for chest pain.   Gastrointestinal:  Negative for abdominal pain, diarrhea, nausea and vomiting.   Musculoskeletal:  Positive for arthralgias, back pain and neck pain.   Skin:  Negative for color change and wound.   Neurological:  Negative for dizziness, weakness, numbness and headaches.   Psychiatric/Behavioral:  Negative for confusion.        Past Medical History:   Diagnosis Date    Arthritis     Asthma     Bipolar disorder     Carpal tunnel syndrome     Chronic obstructive pulmonary disease (COPD)     GERD (gastroesophageal reflux disease)     History of abnormal cervical Pap smear     History of diabetes mellitus     prior to gastric bypass    History of hypertension     prior to gastric bypass    History of shingles 12/2023    History of thyroid cancer 2015    Hypothyroidism, postsurgical     Obstructive sleep apnea     PCOS (polycystic ovarian syndrome)     PTSD (post-traumatic stress disorder)     Spondylosis        Allergies   Allergen Reactions    Bupropion Hives and Unknown - Low Severity     blisters    Seroquel [Quetiapine] Swelling    Gabapentin Rash       Past Surgical History:   Procedure Laterality Date    BREAST BIOPSY   2015    CERVICAL POLYPECTOMY  2021    GASTRIC BYPASS  2021    olga lidia-en-y    THYROIDECTOMY, PARTIAL  2015    right, isthmus       Family History   Problem Relation Age of Onset    Ovarian cancer Mother     Breast cancer Mother     Mental illness Mother     Hyperlipidemia Mother     Hypertension Mother     Heart disease Mother     Diabetes Mother     Colon cancer Mother     Arthritis Mother     Migraines Mother     Tuberculosis Mother     Ovarian cancer Maternal Grandmother     Breast cancer Maternal Grandmother        Social History     Socioeconomic History    Marital status:    Tobacco Use    Smoking status: Former     Current packs/day: 0.00     Average packs/day: 1 pack/day for 35.7 years (35.7 ttl pk-yrs)     Types: Cigarettes     Start date: 1985     Quit date: 2020     Years since quittin.3    Smokeless tobacco: Never   Vaping Use    Vaping status: Never Used   Substance and Sexual Activity    Alcohol use: Not Currently    Drug use: Never    Sexual activity: Defer           Objective   Physical Exam  Vitals and nursing note reviewed.   Constitutional:       General: She is not in acute distress.     Appearance: She is well-developed.   HENT:      Head: Atraumatic.      Nose: Nose normal.   Eyes:      General: Lids are normal.      Conjunctiva/sclera: Conjunctivae normal.      Pupils: Pupils are equal, round, and reactive to light.   Cardiovascular:      Rate and Rhythm: Normal rate and regular rhythm.      Heart sounds: Normal heart sounds.   Pulmonary:      Effort: Pulmonary effort is normal.      Breath sounds: Normal breath sounds. No wheezing.   Abdominal:      General: There is no distension.      Palpations: Abdomen is soft.      Tenderness: There is no abdominal tenderness. There is no guarding or rebound.   Musculoskeletal:         General: No tenderness. Normal range of motion.      Cervical back: Normal range of motion and neck supple. Tenderness present.  Normal range of motion.      Thoracic back: Tenderness present. Decreased range of motion.      Lumbar back: No tenderness. Normal range of motion.      Left hip: Tenderness present. Normal range of motion.      Left knee: Normal range of motion. No tenderness.      Left ankle: Tenderness present. Decreased range of motion.   Skin:     General: Skin is warm and dry.      Findings: No erythema or rash.   Neurological:      Mental Status: She is alert and oriented to person, place, and time.      Sensory: No sensory deficit.   Psychiatric:         Mood and Affect: Mood is anxious.         Speech: Speech normal.         Behavior: Behavior normal.         Procedures           ED Course  ED Course as of 01/13/25 2034 Mon Jan 13, 2025 1816 I personally reviewed and interpreted vitals. [RT]   1816 BP: 162/87 [RT]   1816 Heart Rate: 67 [RT]   1816 SpO2: 94 %  RA [RT]   1816 I personally and independently reviewed CT C spine and T spine as well as left ankle and left hip and agree with the radiology interpretation specifically no acute findings but did not a thyroid nodule.  [RT]      ED Course User Index  [RT] Tati Cabrales PA        No results found for this or any previous visit (from the past 24 hours).  Note: In addition to lab results from this visit, the labs listed above may include labs taken at another facility or during a different encounter within the last 24 hours. Please correlate lab times with ED admission and discharge times for further clarification of the services performed during this visit.    CT Cervical Spine Without Contrast   Final Result   Impression:   1.No acute traumatic injury identified.   2.Prominent left thyroid lobe. Nonemergent follow-up thyroid ultrasound recommended.            Electronically Signed: West Hyman MD     1/13/2025 5:36 PM EST     Workstation ID: [id number]      CT Thoracic Spine Without Contrast   Final Result   Impression:   1.No acute traumatic injury identified.  "  2.Prominent left thyroid lobe. Nonemergent follow-up thyroid ultrasound recommended.            Electronically Signed: West Hyman MD     1/13/2025 5:36 PM EST     Workstation ID: [id number]      XR Hip With or Without Pelvis 2 - 3 View Left   Final Result   Impression:   Unremarkable exam         Electronically Signed: West Hyman MD     1/13/2025 4:34 PM EST     Workstation ID: [id number]      XR Ankle 3+ View Left   Final Result   Impression:   No acute traumatic injury identified         Electronically Signed: West Hyman MD     1/13/2025 4:35 PM EST     Workstation ID: [id number]        Vitals:    01/13/25 1406   BP: 162/87   BP Location: Right arm   Patient Position: Sitting   Pulse: 67   Resp: 14   Temp: 97.5 °F (36.4 °C)   TempSrc: Oral   SpO2: 94%   Weight: 81.6 kg (180 lb)   Height: 160 cm (63\")     Medications - No data to display  ECG/EMG Results (last 24 hours)       ** No results found for the last 24 hours. **          No orders to display       DISCHARGE    Patient discharged in stable condition.    Reviewed implications of results, diagnosis, meds, responsibility to follow up, warning signs and symptoms of possible worsening, potential complications and reasons to return to ER.    Patient/Family voiced understanding of above instructions.    Discussed plan for discharge, as there is no emergent indication for admission.  Pt/family is agreeable and understands need for follow up and possible repeat testing.  Pt/family is aware that discharge does not mean that nothing is wrong but that it indicates no emergency is currently present that requires admission and they must continue care with follow-up as given below or with a physician of their choice.     FOLLOW-UP  Doug Worrell MD  [address] [place] [address] [phone]    Schedule an appointment as soon as possible for a visit       [facility] EMERGENCY DEPARTMENT  [address] " [id number] [phone]    If symptoms worsen         Medication List        New Prescriptions      methylPREDNISolone 4 MG dose pack  Commonly known as: MEDROL  Take as directed on package instructions.               Where to Get Your Medications        These medications were sent to [id number] - [place], [place] - [address]  AT [address] - [phone]  - [phone]  [address], [facility] [address]      Phone: [phone]   methylPREDNISolone 4 MG dose pack  tiZANidine 2 MG tablet                                                      Medical Decision Making  Pt is a 43 yo female presenting to ED with complaints of pain after a fall. I had a discussion with the patient / family regarding ED course, diagnosis, diagnostic results and treatment plan including medications and admission / discharge. Discussed if new or worse symptoms / concerns to return to ED for further evaluation. Discussed need for close follow up with PCP / specialists.     DDx  Fracture, Contusion, Sprain, Spinal injury     Problems Addressed:  Contusion of left hip, initial encounter: complicated acute illness or injury  Neck sprain, initial encounter: complicated acute illness or injury  Sprain of left ankle, unspecified ligament, initial encounter: complicated acute illness or injury  Thoracic sprain: complicated acute illness or injury  Thyroid nodule: complicated acute illness or injury    Amount and/or Complexity of Data Reviewed  External Data Reviewed: notes.     Details: Reviewed previous non ED visits including prior labs, imaging, available notes, medications, allergies and surgical hx.   PCP 1/9/25 pain after a fall   Radiology: ordered and independent interpretation performed. Decision-making details documented in ED Course.    Risk  Prescription drug management.        Final diagnoses:   Neck sprain, initial encounter   Thoracic sprain    Sprain of left ankle, unspecified ligament, initial encounter   Contusion of left hip, initial encounter   Thyroid nodule       ED Disposition  ED Disposition       ED Disposition   Discharge    Condition   Stable    Comment   --               Doug Worrell MD  [address]  [place] [address]  [phone]    Schedule an appointment as soon as possible for a visit       [facility] EMERGENCY DEPARTMENT  [address] [facility] [address] [phone]    If symptoms worsen         Medication List        New Prescriptions      methylPREDNISolone 4 MG dose pack  Commonly known as: MEDROL  Take as directed on package instructions.               Where to Get Your Medications        These medications were sent to [id number] - [place], [place] - [address] [facility] - [phone] [facility] [phone]   [address], [facility] [address]      Phone: [phone]   methylPREDNISolone 4 MG dose pack  tiZANidine 2 MG tablet            Tati Cabrales PA  01/13/25 2034

## 2025-01-16 ENCOUNTER — HOSPITAL ENCOUNTER (OUTPATIENT)
Dept: GENERAL RADIOLOGY | Facility: HOSPITAL | Age: 45
Discharge: HOME | End: 2025-01-16
Admitting: INTERNAL MEDICINE
Payer: MEDICARE

## 2025-01-16 ENCOUNTER — OFFICE VISIT (OUTPATIENT)
Dept: FAMILY MEDICINE CLINIC | Facility: CLINIC | Age: 45
End: 2025-01-16
Payer: MEDICARE

## 2025-01-16 ENCOUNTER — OFFICE VISIT (OUTPATIENT)
Dept: SLEEP MEDICINE | Age: 45
End: 2025-01-16
Payer: MEDICARE

## 2025-01-16 VITALS
HEIGHT: 63 IN | OXYGEN SATURATION: 98 % | WEIGHT: 182 LBS | TEMPERATURE: 98.5 F | HEART RATE: 63 BPM | SYSTOLIC BLOOD PRESSURE: 118 MMHG | DIASTOLIC BLOOD PRESSURE: 80 MMHG | BODY MASS INDEX: 32.25 KG/M2

## 2025-01-16 VITALS
HEART RATE: 66 BPM | BODY MASS INDEX: 32 KG/M2 | HEIGHT: 63 IN | DIASTOLIC BLOOD PRESSURE: 71 MMHG | WEIGHT: 180.6 LBS | SYSTOLIC BLOOD PRESSURE: 172 MMHG | OXYGEN SATURATION: 98 %

## 2025-01-16 DIAGNOSIS — M25.511 ACUTE PAIN OF BOTH SHOULDERS: ICD-10-CM

## 2025-01-16 DIAGNOSIS — M25.571 ACUTE RIGHT ANKLE PAIN: ICD-10-CM

## 2025-01-16 DIAGNOSIS — E66.09 CLASS 1 OBESITY DUE TO EXCESS CALORIES WITH SERIOUS COMORBIDITY AND BODY MASS INDEX (BMI) OF 32.0 TO 32.9 IN ADULT: Primary | ICD-10-CM

## 2025-01-16 DIAGNOSIS — M25.512 ACUTE PAIN OF BOTH SHOULDERS: ICD-10-CM

## 2025-01-16 DIAGNOSIS — M79.642 LEFT HAND PAIN: ICD-10-CM

## 2025-01-16 DIAGNOSIS — M25.561 ACUTE PAIN OF RIGHT KNEE: ICD-10-CM

## 2025-01-16 DIAGNOSIS — M79.641 RIGHT HAND PAIN: Primary | ICD-10-CM

## 2025-01-16 DIAGNOSIS — E66.811 CLASS 1 OBESITY DUE TO EXCESS CALORIES WITH SERIOUS COMORBIDITY AND BODY MASS INDEX (BMI) OF 32.0 TO 32.9 IN ADULT: Primary | ICD-10-CM

## 2025-01-16 DIAGNOSIS — G47.33 OSA ON CPAP: ICD-10-CM

## 2025-01-16 PROCEDURE — 1125F AMNT PAIN NOTED PAIN PRSNT: CPT | Performed by: INTERNAL MEDICINE

## 2025-01-16 PROCEDURE — 73560 X-RAY EXAM OF KNEE 1 OR 2: CPT

## 2025-01-16 PROCEDURE — 73030 X-RAY EXAM OF SHOULDER: CPT

## 2025-01-16 PROCEDURE — 3078F DIAST BP <80 MM HG: CPT | Performed by: INTERNAL MEDICINE

## 2025-01-16 PROCEDURE — 3077F SYST BP >= 140 MM HG: CPT | Performed by: INTERNAL MEDICINE

## 2025-01-16 PROCEDURE — 99214 OFFICE O/P EST MOD 30 MIN: CPT | Performed by: INTERNAL MEDICINE

## 2025-01-16 PROCEDURE — 73610 X-RAY EXAM OF ANKLE: CPT

## 2025-01-16 NOTE — PROGRESS NOTES
Mallorie Dover is a 44 y.o. female.   Chief Complaint   Patient presents with    New Patient    Sleep Apnea     On CPAP    Daytime Sleepiness    Fatigue    Morning Headaches    Heartburn       HPI     44 y.o. female seen in consultation at the request of Doug Worrell MD for evaluation of the above.     She was diagnosed with obstructive sleep apnea about 7 years ago to her best recollection.  She had a subsequent HSAT on 5/1/2021 per her outside records revealing an AHI of 12.8.  She weighed 241 pounds at that time.  She is currently down to 180 pounds.  At 1 point she weighed 350 pounds and she underwent gastric bypass surgery about 6 years ago.    She is using CPAP therapy consistently.  She uses it every night and she typically does not go without it so she does not know how she is benefiting from it at her lower weight.  Reportedly she had a sleep study at a lower weight a year ago but I cannot find the results of this.    She is currently on auto CPAP range of 6-20 cm H2O.  She averages 9 hours and 18 minutes of usage per night and has not missed a night in 3 months.  Her AHI is 1.7 with a 95th percentile pressure of 8.3 cm H2O.  Mask leak is low.    [place] Scale is: 15/24    The patient's relevant past medical, surgical, family, and social history reviewed and updated in Epic as appropriate.    Current medications are:   Current Outpatient Medications:     acetaminophen (TYLENOL) 650 MG 8 hr tablet, Take 1 tablet by mouth., Disp: , Rfl:     Advair Diskus 250-50 MCG/ACT DISKUS, Inhale 1 puff 2 (Two) Times a Day., Disp: 1 each, Rfl: 9    albuterol sulfate  (90 Base) MCG/ACT inhaler, Inhale 2 puffs As Needed for Wheezing., Disp: 18 g, Rfl: 5    azelastine (ASTELIN) 0.1 % nasal spray, Administer 2 sprays into the nostril(s) as directed by provider 2 (Two) Times a Day., Disp: , Rfl:     calamine lotion, Apply  topically to the appropriate area as directed 3 (Three) Times a Day As Needed for  "Itching (itching)., Disp: 180 mL, Rfl: 0    cetirizine (zyrTEC) 10 MG tablet, Take 1 tablet by mouth Daily., Disp: , Rfl:     fluticasone (FLONASE) 50 MCG/ACT nasal spray, 2 sprays by Each Nare route Daily. Shake well before using., Disp: 48 g, Rfl: 2    guaifenesin-dextromethorphan 600-30 mg (MUCINEX DM)  MG tablet sustained-release 12 hour, Take 2 tablets by mouth 2 (Two) Times a Day As Needed (cough)., Disp: 60 each, Rfl: 2    meloxicam (Mobic) 15 MG tablet, Take 1 tablet by mouth Daily., Disp: 90 tablet, Rfl: 1    methylPREDNISolone (MEDROL) 4 MG dose pack, Take as directed on package instructions., Disp: 1 each, Rfl: 0    multivitamin with minerals (MULTI FOR HER PO), Take 1 tablet by mouth Daily., Disp: , Rfl:     omeprazole (priLOSEC) 40 MG capsule, Take 1 capsule by mouth Daily., Disp: 90 capsule, Rfl: 2    ondansetron ODT (ZOFRAN-ODT) 4 MG disintegrating tablet, Place 1 tablet on the tongue Every 8 (Eight) Hours As Needed for Nausea or Vomiting., Disp: 6 tablet, Rfl: 0    tiZANidine (ZANAFLEX) 2 MG tablet, Take 1 tablet by mouth Every 12 (Twelve) Hours As Needed for Muscle Spasms., Disp: 180 tablet, Rfl: 0    valACYclovir (VALTREX) 1000 MG tablet, Take 0.5 tablets by mouth Daily., Disp: 14 tablet, Rfl: 0.    Review of Systems    Review of Systems  ROS documented in patient questionnaire ×14 systems.  Reviewed with patient.  Otherwise negative except as noted in HPI.    Physical Exam    Blood pressure 118/80, pulse 63, temperature 98.5 °F (36.9 °C), temperature source Oral, height 160 cm (63\"), weight 82.6 kg (182 lb), last menstrual period 11/28/2024, SpO2 98%. Body mass index is 32.24 kg/m².    Physical Exam  Vitals reviewed.   Constitutional:       Appearance: She is well-developed.   HENT:      Head: Normocephalic and atraumatic.      Mouth/Throat:      Mouth: Mucous membranes are moist.      Pharynx: Oropharynx is clear.      Comments: Class IV airway  Neck:      Thyroid: No thyromegaly. "   Cardiovascular:      Rate and Rhythm: Normal rate and regular rhythm.      Heart sounds: No murmur heard.     No friction rub. No gallop.   Pulmonary:      Effort: Pulmonary effort is normal. No respiratory distress.      Breath sounds: No wheezing or rales.   Musculoskeletal:      Cervical back: Neck supple.   Skin:     General: Skin is warm and dry.   Neurological:      Mental Status: She is alert and oriented to person, place, and time.   Psychiatric:         Behavior: Behavior normal.         Thought Content: Thought content normal.         DATA:    Reviewed 10/17/2024 note from Dr. Worrell    Reviewed 11/16/2023 note from Dr. Callaway    ASSESSMENT:    Problem List Items Addressed This Visit          Pulmonary Problems    MYNOR on CPAP    Relevant Orders    Home Sleep Study       Other    Class 1 obesity due to excess calories with serious comorbidity and body mass index (BMI) of 32.0 to 32.9 in adult - Primary       44-year-old female with a longstanding history of obstructive sleep apnea diagnosed about 7 years ago.  At that time she weighed 350 pounds and underwent gastric bypass about 6 years ago.  Her current weight is 180 pounds.  She is wondering whether she needs to continue to use CPAP.  She is doing well with that based upon her download.  Her compliance is good.  Her mask fit is good and her AHI is normal.    I told her the 2 ways we could tell whether she needs CPAP at this point would be subjectively and she can try not using it on some nights to see if she feels any different the next day or has a worse night.  The other way would be objectively with a home sleep apnea test performed off of CPAP at her current weight and I will order that.    PLAN:    HSAT as above to see if she still has an elevated AHI off of CPAP  She will try experimenting on several nights to see if she notes any difference in her sleep quality or daytime functionality without CPAP therapy  Based upon the above information I can  advise her on whether to continue using the CPAP or not  Follow-up after the above    I have reviewed the results of my evaluation and impression and discussed my recommendations in detail with the patient.    Signed by  Pratik Edward MD    January 16, 2025      CC: Doug Worrell MD Banks, William R, MD

## 2025-01-16 NOTE — PROGRESS NOTES
Mallorie Dover  1980  [id number]  Patient Care Team:  Doug Worrell MD as PCP - General (Internal Medicine)  Pratik Edward MD as Consulting Physician (Pulmonary Disease)    Mallorie Dover is a 44 y.o. female here today for follow up.     This patient is accompanied by their self who contributes to the history of their care.    Chief Complaint:    Chief Complaint   Patient presents with    Hospital Follow Up Visit         History of Present Illness:  I have reviewed and/or updated the patient's past medical, past surgical, family, social history, problem list and allergies as appropriate.     Pt is a 45 yo female presenting to ED with complaints of neck and back pain after a fall. PMHx significant for Thyroid cancer, HTN, Hypothyroidism, PTSD, PCOS, Bipolar, COPD, MYNOR and Asthma. Pt complains of neck, upper back, left hip and left ankle pain. She fell 12-27-24 and has had prior xray so wrists / hands but reports no imaging of neck / back or left leg. She denies taking blood thinners. Denies headache or dizziness. She weakness or numbness to extremities.     She developed more pain after her fall- after seeing her in the offic. She reported  new neck pain. Now having right sided pain- upper arm right thigh. And right base or back. Pain is constant dagger like. No alleviiating  factor. Was give steroids with some  minimal improvement. ( She did start her cycle).   Denies weakness. ( Hands chronically numb form carpal tunnel. She is doing ice and heat. She can walk with some pain.  Neck pain appears to be better.  At times her right shoulder pain causes chest pain with deep inspiration      Review of Systems   Constitutional: Negative.    HENT: Negative.     Genitourinary: Negative.    Musculoskeletal:  Positive for arthralgias and myalgias. Negative for joint swelling.   Hematological: Negative.        Vitals:    01/16/25 1504   BP: 172/71   Pulse: 66   SpO2: 98%   Weight: 81.9 kg (180  "lb 9.6 oz)   Height: 160 cm (62.99\")     Body mass index is 32 kg/m².    Physical Exam  Vitals and nursing note reviewed.   Constitutional:       General: She is not in acute distress.     Appearance: She is well-developed. She is not diaphoretic.   HENT:      Head: Normocephalic and atraumatic.      Right Ear: External ear normal.      Left Ear: External ear normal.      Mouth/Throat:      Pharynx: No oropharyngeal exudate.   Eyes:      General: No scleral icterus.        Right eye: No discharge.      Conjunctiva/sclera: Conjunctivae normal.   Neck:      Thyroid: No thyromegaly.      Vascular: No JVD.      Trachea: No tracheal deviation.   Cardiovascular:      Rate and Rhythm: Normal rate and regular rhythm.      Heart sounds: Normal heart sounds.      Comments: PMI nondisplaced  Pulmonary:      Effort: Pulmonary effort is normal.      Breath sounds: Normal breath sounds. No wheezing or rales.   Abdominal:      General: Bowel sounds are normal.      Palpations: Abdomen is soft.      Tenderness: There is no abdominal tenderness. There is no guarding or rebound.   Musculoskeletal:         General: Tenderness present. No swelling.      Cervical back: Normal range of motion and neck supple. No rigidity or tenderness.      Right lower leg: No edema.      Left lower leg: No edema.      Comments: She has marked discomfort in her right foot and heel region with arm manipulation worse with arm extended and externally rotated.  There is palpable tenderness in this area.  No crepitus.  Elbow range of motion is unremarkable.  No palpable tenderness.  She has some left shoulder pain and limited range of motion which is painful with elevation external/internal rotation and abduction.  It is this way on the right as well.  Knee reveals medial and lateral joint line tenderness.  Jamal's sign equivocal.  No drawer sign.  No effusion.   Lymphadenopathy:      Cervical: No cervical adenopathy.   Skin:     General: Skin is warm and " dry.      Capillary Refill: Capillary refill takes less than 2 seconds.      Coloration: Skin is not pale.      Findings: No rash.   Neurological:      Mental Status: She is alert and oriented to person, place, and time.      Motor: No abnormal muscle tone.      Coordination: Coordination normal.   Psychiatric:         Mood and Affect: Mood normal.         Behavior: Behavior normal.         Judgment: Judgment normal.         Procedures    Results Review:    I reviewed the patient's new clinical results.   CT Thoracic Spine Without Contrast Final result 1/13/2025          Narrative   CT CERVICAL SPINE WO CONTRAST, CT THORACIC SPINE WO CONTRAST    Date of Exam: 1/13/2025 5:22 PM EST    Indication: pain, fall.  T CERVICAL SPINE WO CONTRAST, CT THORACIC SPINE WO CONTRAST     Date of Exam: 1/13/2025 5:22 PM EST     Indication: pain, fall.     Comparison: None available.     Technique: Axial CT images were obtained of the cervical and thoracic spine without contrast administration.  Reconstructed coronal and sagittal images were also obtained. Automated exposure control and iterative construction methods were used.        Findings:  CT CERVICAL SPINE  OSSEOUS STRUCTURES: No fracture nor bony destructive process.     ALIGNMENT:  Normal     DISC SPACE: There is mild to moderate lower cervical spondylosis.     JOINTS: No significant arthropathy.     SOFT TISSUES: Prominent left thyroid gland. Nonemergent follow-up thyroid ultrasound recommended.     LUNG APICES: Unremarkable     LEVELS: No significant osseous central canal nor foraminal stenosis identified at any level.        CT THORACIC SPINE  OSSEOUS STRUCTURES: No fracture nor bony destructive process.     ALIGNMENT:  Normal     DISC SPACE: There is mild mid thoracic spondylosis.     JOINTS: No significant arthropathy.     SOFT TISSUES:  Unremarkable        LEVELS: No significant osseous central canal nor foraminal stenosis identified at any level.   ...   Impression    Impression:  1.No acute traumatic injury identified.  2.Prominent left thyroid lobe. Nonemergent follow-up thyroid ultrasound recommended.          XR HIP W OR WO PELVIS 2-3 VIEW LEFT     Date of Exam: 1/13/2025 4:09 PM EST     Indication: pain, fall     Comparison: None available.     Findings:  No evidence of acute fracture nor dislocation. Alignment is normal. Joint space is adequately maintained. Soft tissues are unremarkable.     IMPRESSION:  Impression:  Unremarkable exam  XR ANKLE 3+ VW LEFT     Date of Exam: 1/13/2025 4:09 PM EST     Indication: pain, fall     Comparison: None available.     Findings:  No evidence of acute fracture nor dislocation. Alignment is normal. There is minimal ankle degenerative arthrosis. There are calcaneal spurs. Soft tissues are unremarkable.     IMPRESSION:  Impression:  No acute traumatic injury identified    Assessment/Plan:    Problem List Items Addressed This Visit    None  Visit Diagnoses       Right hand pain    -  Primary    Relevant Orders    Ambulatory Referral to Hand Surgery    Left hand pain        Relevant Orders    Ambulatory Referral to Hand Surgery    Acute pain of both shoulders        Relevant Orders    Ambulatory Referral to Orthopedic Surgery    Ambulatory Referral to Physical Therapy for Evaluation & Treatment    Acute pain of right knee        Relevant Orders    XR Knee 1 or 2 View Right    XR Ankle 3+ View Right    Ambulatory Referral to Orthopedic Surgery    Ambulatory Referral to Physical Therapy for Evaluation & Treatment    Acute right ankle pain        Relevant Orders    Ambulatory Referral to Orthopedic Surgery    Ambulatory Referral to Physical Therapy for Evaluation & Treatment        I suspect she has severe muscle strain multiple after fall.  X-rays as above.  Have extended physical therapy however given the severity of her right shoulder pain and knee pain have requested general Ortho referral.  I think it would be appropriate to see hand  given her tenderness over her snuffbox and at last visit.    Plan of care reviewed with patient at the conclusion of today's visit. Education was provided regarding diagnosis and management.  Patient verbalizes understanding of and agreement with management plan.    Return if symptoms worsen or fail to improve, for Next scheduled follow up.    Doug Worrell MD      Please note than portions of this note were completed wt a Voice Recognition Program

## 2025-01-23 ENCOUNTER — OFFICE VISIT (OUTPATIENT)
Dept: ORTHOPEDIC SURGERY | Facility: CLINIC | Age: 45
End: 2025-01-23
Payer: MEDICARE

## 2025-01-23 VITALS
SYSTOLIC BLOOD PRESSURE: 142 MMHG | HEIGHT: 63 IN | DIASTOLIC BLOOD PRESSURE: 90 MMHG | BODY MASS INDEX: 31.99 KG/M2 | WEIGHT: 180.56 LBS

## 2025-01-23 DIAGNOSIS — M75.52 BURSITIS OF LEFT SHOULDER: ICD-10-CM

## 2025-01-23 DIAGNOSIS — M25.511 ACUTE PAIN OF BOTH SHOULDERS: ICD-10-CM

## 2025-01-23 DIAGNOSIS — G56.22 CUBITAL TUNNEL SYNDROME ON LEFT: ICD-10-CM

## 2025-01-23 DIAGNOSIS — S16.1XXA STRAIN OF NECK MUSCLE, INITIAL ENCOUNTER: Primary | ICD-10-CM

## 2025-01-23 DIAGNOSIS — M25.512 ACUTE PAIN OF BOTH SHOULDERS: ICD-10-CM

## 2025-01-23 DIAGNOSIS — S63.502A SPRAIN OF LEFT WRIST, INITIAL ENCOUNTER: ICD-10-CM

## 2025-01-23 DIAGNOSIS — M54.12 CERVICAL RADICULOPATHY: ICD-10-CM
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
## 2025-01-24 ENCOUNTER — TREATMENT (OUTPATIENT)
Dept: PHYSICAL THERAPY | Facility: CLINIC | Age: 45
End: 2025-01-24
Payer: MEDICARE

## 2025-01-24 ENCOUNTER — PATIENT ROUNDING (BHMG ONLY) (OUTPATIENT)
Dept: ORTHOPEDIC SURGERY | Facility: CLINIC | Age: 45
End: 2025-01-24
Payer: MEDICARE

## 2025-01-24 DIAGNOSIS — M54.50 CHRONIC BILATERAL LOW BACK PAIN, UNSPECIFIED WHETHER SCIATICA PRESENT: ICD-10-CM

## 2025-01-24 DIAGNOSIS — M79.644 BILATERAL THUMB PAIN: ICD-10-CM

## 2025-01-24 DIAGNOSIS — M79.645 BILATERAL THUMB PAIN: ICD-10-CM

## 2025-01-24 DIAGNOSIS — M25.511 ACUTE PAIN OF BOTH SHOULDERS: Primary | ICD-10-CM

## 2025-01-24 DIAGNOSIS — G89.29 CHRONIC NECK PAIN: ICD-10-CM

## 2025-01-24 DIAGNOSIS — M25.512 ACUTE PAIN OF BOTH SHOULDERS: Primary | ICD-10-CM

## 2025-01-24 DIAGNOSIS — M25.561 ACUTE PAIN OF RIGHT KNEE: ICD-10-CM
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
## 2025-01-24 DIAGNOSIS — G89.29 CHRONIC BILATERAL LOW BACK PAIN, UNSPECIFIED WHETHER SCIATICA PRESENT: ICD-10-CM

## 2025-01-24 DIAGNOSIS — M54.2 CHRONIC NECK PAIN: ICD-10-CM

## 2025-01-24 PROCEDURE — 97162 PT EVAL MOD COMPLEX 30 MIN: CPT | Performed by: PHYSICAL THERAPIST

## 2025-01-24 PROCEDURE — 97110 THERAPEUTIC EXERCISES: CPT | Performed by: PHYSICAL THERAPIST

## 2025-01-24 NOTE — PROGRESS NOTES
January 24, 2025    Hello, may I speak with Mallorie Dover?    My name is Glendy      I am  with [facility]  [address]  [facility] [id number].    Before we get started may I verify your date of birth? 1980    I am calling to officially welcome you to our practice and ask about your recent visit. Is this a good time to talk? yes    Tell me about your visit with us. What things went well?  Commented that our office is the best of the best.  No concerns.  Her privacy and security were well guarded.       We're always looking for ways to make our patients' experiences even better. Do you have recommendations on ways we may improve?  no    Overall were you satisfied with your first visit to our practice? yes       I appreciate you taking the time to speak with me today. Is there anything else I can do for you? no      Thank you, and have a great day.

## 2025-01-24 NOTE — PROGRESS NOTES
Physical Therapy Initial Evaluation and Plan of Care  [address] [place] [address]                  [phone]    Patient: Mallorie Dover   : 1980  Diagnosis/ICD-10 Code:  Acute pain of both shoulders [M25.511, M25.512]  Referring practitioner: Doug Worrell MD    Subjective Evaluation    History of Present Illness  Mechanism of injury: Patient fell at [facility] in December. Fells onto both hands onto edge of concrete. Can't lift arms due to pain. Pain in shoulder blades. Having neck spasms. Right knee pain and some bruising.     Had neck and back pain before and this has exacerbated. Already had numbness and tingling which has gotten worse       Patient Occupation: disability Pain  Pain scale: high level.  At worst pain rating: 10  Location: b/l shoulders, b/l thumbs, right knee, right ankle.  Quality: dull ache  Aggravating factors: movement, lifting, sleeping, stairs, ambulation, squatting and standing    Hand dominance: right    Patient Goals  Patient goals for therapy: decreased edema, decreased pain, increased motion, return to sport/leisure activities, independence with ADLs/IADLs and increased strength             Objective          Postural Observations  Seated posture: poor  Standing posture: poor    Additional Postural Observation Details  Slumped posture, forward head, rounded shoulders     Observations     Additional Wrist/Hand Observation Details  Bilateral bruising at thumb MP UCL     Palpation   Left   Hypertonic in the cervical paraspinals, levator scapulae, lower trapezius, scalenes, sternocleidomastoid, suboccipitals and upper trapezius.   Tenderness of the cervical paraspinals, deltoid, levator scapulae, lower trapezius, scalenes, sternocleidomastoid, suboccipitals and upper trapezius.     Right   Hypertonic in the cervical paraspinals, levator scapulae, lower trapezius, scalenes, suboccipitals and upper trapezius. Tenderness of the cervical paraspinals,  deltoid, distal biceps femoris, levator scapulae, lower trapezius, rectus femoris, scalenes, suboccipitals, upper trapezius, vastus lateralis and vastus medialis.     Tenderness   Cervical Spine   Tenderness in the facet joint, spinous process, left scapula, left 1st rib, right scapula and right 1st rib.     Left Shoulder   Tenderness in the clavicle.     Right Shoulder  Tenderness in the clavicle.     Left Wrist/Hand   Tenderness in the carpometacarpal joint.     Right Wrist/Hand   Tenderness in the carpometacarpal joint.     Right Knee   Tenderness in the lateral joint line, medial joint line, patellar tendon and quadriceps tendon.     Additional Tenderness Details  Gross hypersensitivity and cervical hypomobility   Sensitivity of bilateral thumb MP UCL.     Active Range of Motion   Cervical/Thoracic Spine   Cervical    Left lateral flexion: 40 degrees with pain  Right lateral flexion: 20 degrees with pain  Left rotation: 55 degrees with pain  Right rotation: 50 degrees with pain  Left Shoulder   Flexion: 85 degrees with pain  Abduction: 90 degrees with pain  External rotation BTH: C2 with pain  Internal rotation BTB: L5 with pain    Right Shoulder   Flexion: 110 degrees with pain  Abduction: 95 degrees with pain  External rotation BTH: C2 with pain  Internal rotation BTB: L5 with pain    Left Wrist   Wrist flexion: 20 degrees with pain  Wrist extension: 60 degrees with pain    Right Wrist   Wrist flexion: 10 degrees with pain  Wrist extension: 65 degrees with pain    Right Knee   Flexion: 120 degrees   Extension: 5 degrees     Patellar Mobility     Additional Patellar Mobility Details  Gross hypersensitivity     Strength/Myotome Testing     Left Shoulder     Planes of Motion   Flexion: 3-   Abduction: 3+   External rotation at 0°: 4-   Internal rotation at 0°: 4-     Right Shoulder     Planes of Motion   Flexion: 3-   Abduction: 3+   External rotation at 0°: 4-   Internal rotation at 0°: 4-     Left Wrist/Hand    Wrist extension: 4-  Wrist flexion: 4-  Radial deviation: 4-  Ulnar deviation: 4-    Right Wrist/Hand   Wrist extension: 4-  Wrist flexion: 4-  Radial deviation: 4-  Ulnar deviation: 4-    Right Knee   Flexion: 4  Extension: 4-  Quadriceps contraction: fair    Additional Strength Details  Did not test  and pinch today due to high level of pain.     Tests     Left Wrist/Hand   Positive RCL varus stress and UCL valgus stress.     Right Wrist/Hand   Positive RCL varus stress and UCL valgus stress.     Additional Tests Details  Minimal UCL and RCL laxity but severe pain bilaterally.   Negative for ligament laxity but gross, nonspecific tenderness with all testing.           Assessment & Plan       Assessment  Impairments: abnormal coordination, abnormal gait, abnormal muscle firing, abnormal muscle tone, abnormal or restricted ROM, activity intolerance, impaired balance, impaired physical strength, lacks appropriate home exercise program and pain with function   Functional limitations: carrying objects, lifting, sleeping, walking, pulling, pushing, uncomfortable because of pain, moving in bed, sitting, standing, stooping, reaching behind back, reaching overhead and unable to perform repetitive tasks   Assessment details: Patient is a 44 year old female presenting with acute pain of multiple body areas and exacerbation of chronic neck and back pain following fall onto concrete curb at [facility]. Patient states she fell onto hands then side. Major issues include bilateral shoulder pain with loss of active ROM, bilateral thumb MP UCL pain and loss of , right knee pain, and exacerbation of chronic neck and back pain. Patient is appropriate for physical therapy to address this issue to restore functional movement, improve ability to care for self, and improve quality of life.   Barriers to therapy: multiple body areas impacted, socioeconomic barriers  Prognosis: fair  Prognosis details: Short Term Goals (4  weeks):  1. Patient will be independent with home exercise program.  2. Patient will demonstrate improved right knee extension strength of ay least 4/5 to improve gait mechanics.   3. Patient will demonstrate bilateral shoulder flexion actively to at least 110 deg to be able to reach lower cabinet shelf.     Long Term Goals (12 weeks):  1. Patient will be able to walk at least 20 minutes with appropriate gait mechanics.   2. Patient will demonstrate functional squat to  5 lbs object from floor.   3. Patient will demonstrate bilateral wrist flexion of at least 45 degrees.       Plan  Therapy options: will be seen for skilled therapy services  Planned modality interventions: ultrasound, thermotherapy (paraffin bath), thermotherapy (hydrocollator packs), traction, TENS, high voltage pulsed current (spasm management), high voltage pulsed current (pain management), electrical stimulation/Russian stimulation, cryotherapy and contrast bath immersion  Planned therapy interventions: therapeutic activities, stretching, strengthening, spinal/joint mobilization, soft tissue mobilization, postural training, neuromuscular re-education, motor coordination training, manual therapy, abdominal trunk stabilization, joint mobilization, IADL retraining, home exercise program, gait training, functional ROM exercises, flexibility, fine motor coordination training, body mechanics training, balance/weight-bearing training, ADL retraining and orthotic fitting/training  Frequency: 2x week  Duration in weeks: 12  Treatment plan discussed with: patient          Access Code: [email or web address]  URL: [email or web address]  Date: 01/24/2025  Prepared by: Yane Romero    Exercises  - Supine Shoulder Flexion with Dowel  - 2-3 x daily - 7 x weekly - 10 reps  - Supine Cervical Retraction with Towel  - 2-3 x daily - 7 x weekly - 10 reps - 4-5 hold  - Seated Long Arc Quad  - 2-3 x daily - 7 x weekly - 10 reps  - Seated Heel Raise  - 2-3 x daily -  7 x weekly - 10 reps  - Seated Heel Toe Raises  - 2-3 x daily - 7 x weekly - 10 reps  - Seated Scapular Retraction  - 2-3 x daily - 7 x weekly - 10 reps - 4-5 hold      Manual Therapy:         mins  97106;  Therapeutic Exercise:    35     mins  61778;     Neuromuscular Ayanna:        mins  10587;    Therapeutic Activity:          mins  84192;     Gait Training:           mins  85283;     Ultrasound:          mins  74960;    Electrical Stimulation:         mins  13151 ( );  Dry Needling          mins self-pay    Timed Treatment:   35   mins   Total Treatment:     55   mins    PT SIGNATURE: Yane Romero, EMILY   DATE TREATMENT INITIATED: 1/27/2025    Medicare Initial Certification  Certification Period: 4/27/2025  I certify that the therapy services are furnished while this patient is under my care.  The services outlined above are required by this patient, and will be reviewed every 90 days.     PHYSICIAN: Doug Worrell MD      DATE:     Please sign and return via fax to [phone].. Thank you, [facility] Physical Therapy.

## 2025-01-28 ENCOUNTER — TREATMENT (OUTPATIENT)
Dept: PHYSICAL THERAPY | Facility: CLINIC | Age: 45
End: 2025-01-28
Payer: MEDICARE

## 2025-01-28 DIAGNOSIS — M25.561 ACUTE PAIN OF RIGHT KNEE: ICD-10-CM

## 2025-01-28 DIAGNOSIS — M54.50 CHRONIC BILATERAL LOW BACK PAIN, UNSPECIFIED WHETHER SCIATICA PRESENT: ICD-10-CM

## 2025-01-28 DIAGNOSIS — G89.29 CHRONIC BILATERAL LOW BACK PAIN, UNSPECIFIED WHETHER SCIATICA PRESENT: ICD-10-CM

## 2025-01-28 DIAGNOSIS — G89.29 CHRONIC NECK PAIN: ICD-10-CM

## 2025-01-28 DIAGNOSIS — M79.644 BILATERAL THUMB PAIN: ICD-10-CM

## 2025-01-28 DIAGNOSIS — M25.511 ACUTE PAIN OF BOTH SHOULDERS: Primary | ICD-10-CM

## 2025-01-28 DIAGNOSIS — M79.645 BILATERAL THUMB PAIN: ICD-10-CM

## 2025-01-28 DIAGNOSIS — M54.2 CHRONIC NECK PAIN: ICD-10-CM

## 2025-01-28 DIAGNOSIS — M25.512 ACUTE PAIN OF BOTH SHOULDERS: Primary | ICD-10-CM

## 2025-01-28 NOTE — PROGRESS NOTES
Physical Therapy Daily Treatment Note         [address] [place] [address]    Patient: Mallorie Dover   : 1980  Diagnosis/ICD-10 Code:  Acute pain of both shoulders [M25.511, M25.512]  Referring practitioner: Doug Worrell MD  Date of Initial Visit: Type: THERAPY  Noted: 2025  Today's Date: 2025  Patient seen for 2 sessions         Mallorie Sarmientos reports: doing okay with HEP. Has had a lot of neck spasms. Forgot to take medications last night.         Objective     Shoulder AAROM flexion 90 deg       See Exercise, Manual, and Modality Logs for complete treatment.       Assessment/Plan  Low tolerance for activity secondary to pain. Will progress as tolerated and focus on assisted motion.   Progress per Plan of Care           Manual Therapy:         mins  82825;  Therapeutic Exercise:    32     mins  21032;     Neuromuscular Ayanna:    15    mins  70290;    Therapeutic Activity:          mins  64065;     Gait Training:           mins  03122;     Ultrasound:         mins  72695;    Electrical Stimulation:         mins  87647 ( );  Dry Needling          mins self-pay    Timed Treatment:   47   mins   Total Treatment:     47   mins    Yane Romero, EMILY  Physical Therapist

## 2025-01-30 ENCOUNTER — OFFICE VISIT (OUTPATIENT)
Age: 45
End: 2025-01-30
Payer: MEDICARE

## 2025-01-30 VITALS
DIASTOLIC BLOOD PRESSURE: 80 MMHG | SYSTOLIC BLOOD PRESSURE: 150 MMHG | BODY MASS INDEX: 31.99 KG/M2 | HEIGHT: 63 IN | WEIGHT: 180.56 LBS

## 2025-01-30 DIAGNOSIS — G56.22 CUBITAL TUNNEL SYNDROME ON LEFT: ICD-10-CM

## 2025-01-30 DIAGNOSIS — M65.4 DE QUERVAIN'S TENOSYNOVITIS: ICD-10-CM

## 2025-01-30 DIAGNOSIS — G56.03 CARPAL TUNNEL SYNDROME, BILATERAL: Primary | ICD-10-CM

## 2025-01-30 RX ORDER — LIDOCAINE HYDROCHLORIDE 10 MG/ML
0.5 INJECTION, SOLUTION EPIDURAL; INFILTRATION; INTRACAUDAL; PERINEURAL
Status: COMPLETED | OUTPATIENT
Start: 2025-01-30 | End: 2025-01-30

## 2025-01-30 RX ORDER — TRIAMCINOLONE ACETONIDE 40 MG/ML
20 INJECTION, SUSPENSION INTRA-ARTICULAR; INTRAMUSCULAR
Status: COMPLETED | OUTPATIENT
Start: 2025-01-30 | End: 2025-01-30

## 2025-01-30 RX ADMIN — LIDOCAINE HYDROCHLORIDE 0.5 ML: 10 INJECTION, SOLUTION EPIDURAL; INFILTRATION; INTRACAUDAL; PERINEURAL at 15:10

## 2025-01-30 RX ADMIN — TRIAMCINOLONE ACETONIDE 20 MG: 40 INJECTION, SUSPENSION INTRA-ARTICULAR; INTRAMUSCULAR at 15:10

## 2025-01-30 NOTE — PROGRESS NOTES
"                                                               [facility] Orthopedic     Office Visit       Date: 01/30/2025   Patient Name: Mallorie Dover  MRN: [id number]  YOB: 1980    Referring Physician: Doug Worrell MD     Chief Complaint:   Chief Complaint   Patient presents with    Left Hand - Pain    Right Hand - Pain       History of Present Illness:   Mallorie Dover is a 44 y.o. female right-hand-dominant presents for evaluation of bilateral left greater than right hand and wrist pain.  Patient reports she fell on her bilateral hands on 12/24/2024.  Reports that she has had persistent pain numbness and tingling in her bilateral hands.  Reports pain in her bilateral radial styloid that radiates distally into her thumbs.  Reports any pain with wrist flexion or range of motion.  She also reports numbness tingling in all 5 fingers of her hand that is worse at night and occasionally wakes her from sleep.  She recently started physical therapy.  She has no other relevant medical history.  She denies smoking.      Subjective   Review of Systems:   Review of Systems   All other systems reviewed and are negative.       Pertinent review of systems per HPI.     I reviewed the patient's chief complaint, history of present illness, review of systems, past medical history, surgical history, family history, social history, medications and allergy list in the EMR on 01/30/2025 and agree with the findings above.    Objective    Vital Signs:   Vitals:    01/30/25 1453   BP: 150/80   Weight: 81.9 kg (180 lb 8.9 oz)   Height: 160 cm (62.99\")     BMI: Body mass index is 31.99 kg/m².    General Appearance: No acute distress. Alert and oriented.     Chest:  Non-labored breathing on room air. Regular rate and rhythm.    Upper Extremity Exam:    Positive Tinel at the bilateral carpal tunnels.  Positive bilateral carpal compression test.  Positive Tinel at the bilateral cubital tunnels.  " Negative elbow flexion compression test.  No thenar wasting.  No intrinsic wasting.    Tenderness to palpation over the bilateral radial styloids.  Positive bilateral Finkelstein's test.    Fingers are warm, well-perfused with appropriate capillary refill.  Palpable radial pulse.    Sensation intact to light touch in median, radial and ulnar nerve distributions.    Motor- Fires [employer], ulnar intrinsics, EPL/EDC w/ full active and passive range of motion. Strength intact.    Non-tender except for in the areas highlighted    Imaging/Studies:   Imaging Results (Last 24 Hours)       ** No results found for the last 24 hours. **          X-ray of the right hand from 1/9/2025 and x-ray of the left wrist from 12/27/2024 were both independently reviewed and interpreted myself and demonstrate no evidence of bony abnormality.      Procedures:  Procedures    Quality Measures:   ACP:   ACP discussion was deferred.    Tobacco:   Mallorie Dover  reports that she quit smoking about 4 years ago. Her smoking use included cigarettes and cigars. She started smoking about 40 years ago. She has a 45 pack-year smoking history. She has been exposed to tobacco smoke. She has never used smokeless tobacco.      Assessment / Plan    Assessment/Plan:     There are no diagnoses linked to this encounter.     Mallorie Sarmientosis a 44 y.o. female who presents with:      ICD-10-CM ICD-9-CM   1. Carpal tunnel syndrome, bilateral  G56.03 354.0   2. De Quervain's tenosynovitis  M65.4 727.04   3. Cubital tunnel syndrome on left  G56.22 354.2         Patient presents with bilateral hand and upper extremity pain.  She has evidence of bilateral de Quervain's tenosynovitis on exam.  I discussed the diagnosis of de Quervain's as well as treatment options going bracing anti-inflammatories corticosteroid injection and surgery.  Recommend bilateral de Quervain's injections today.    She also has evidence of bilateral carpal and cubital tunnel syndrome.   We discussed diagnoses as well as treatment options.  Recommend nighttime bracing and EMG nerve conduction study to evaluate the severity.  Recommend follow-up after EMG nerve study to discuss treatment options.    Procedure Note: DeQuervjulien's injection    I discussed with the patient the potential benefits of performing therapeutic injections as well as potential risks including but not limited to infection, swelling, pain, bleeding, bruising, nerve/vessel damage, skin color changes, transient elevation in blood glucose levels, and fat atrophy. After informed consent and after the areas were prepped with chlorhexadine soap, ethyl chloride was used to numb the skin. Using anatomic landmarks, 20mg of Kenalog and 0.5 cc of 1% lidocaine was injected into the first dorsal compartment of the bilateral wrist.  The patient tolerated the procedure well. There were no complications. A sterile dressing was placed over the injection sites.      Follow Up:   No follow-ups on file.        Doug Byrnes MD  [facility] Hand and Upper Extremity Surgeon

## 2025-01-30 NOTE — PROGRESS NOTES
Procedure   - Hand/Upper Extremity Injection: bilateral extensor compartment 1 for de Quervain's tenosynovitis on 1/30/2025 3:10 PM  Indications: pain  Details: 27 G needle, radial approach  Medications (Right): 20 mg triamcinolone acetonide 40 MG/ML; 0.5 mL lidocaine PF 1% 1 %  Medications (Left): 20 mg triamcinolone acetonide 40 MG/ML; 0.5 mL lidocaine PF 1% 1 %  Outcome: tolerated well, no immediate complications  Procedure, treatment alternatives, risks and benefits explained, specific risks discussed. Consent was given by the patient. Immediately prior to procedure a time out was called to verify the correct patient, procedure, equipment, support staff and site/side marked as required. Patient was prepped and draped in the usual sterile fashion.

## 2025-02-04 DIAGNOSIS — B02.9 HERPES ZOSTER WITHOUT COMPLICATION: ICD-10-CM

## 2025-02-04 RX ORDER — CALAMINE
LOTION (ML) TOPICAL 3 TIMES DAILY PRN
Qty: 180 ML | Refills: 0 | Status: SHIPPED | OUTPATIENT
Start: 2025-02-04

## 2025-02-04 RX ORDER — VALACYCLOVIR HYDROCHLORIDE 1 G/1
500 TABLET, FILM COATED ORAL DAILY
Qty: 30 TABLET | Refills: 0 | Status: SHIPPED | OUTPATIENT
Start: 2025-02-04

## 2025-02-04 NOTE — TELEPHONE ENCOUNTER
Caller: DoverMallorie    Relationship: Self    Best call back number: [phone]     Requested Prescriptions:   Requested Prescriptions     Pending Prescriptions Disp Refills    valACYclovir (VALTREX) 1000 MG tablet 14 tablet 0     Sig: Take 0.5 tablets by mouth Daily.    calamine lotion 180 mL 0     Sig: Apply  topically to the appropriate area as directed 3 (Three) Times a Day As Needed for Itching (itching).        Pharmacy where request should be sent: [id number] [place], [place] - [address]  AT [facility]  & [address] [facility] - [phone]  - [phone] FX     Last office visit with prescribing clinician: 1/16/2025   Last telemedicine visit with prescribing clinician: Visit date not found   Next office visit with prescribing clinician: 2/21/2025     Additional details provided by patient: OUT OF MEDICATION    Does the patient have less than a 3 day supply:  [x] Yes  [] No    Would you like a call back once the refill request has been completed: [] Yes [x] No    If the office needs to give you a call back, can they leave a voicemail: [] Yes [x] No    Selena Craig Rep   02/04/25 09:16 EST

## 2025-02-10 ENCOUNTER — LAB (OUTPATIENT)
Dept: LAB | Facility: HOSPITAL | Age: 45
End: 2025-02-10
Payer: MEDICARE

## 2025-02-10 ENCOUNTER — OFFICE VISIT (OUTPATIENT)
Dept: FAMILY MEDICINE CLINIC | Facility: CLINIC | Age: 45
End: 2025-02-10
Payer: MEDICARE

## 2025-02-10 VITALS
WEIGHT: 176.2 LBS | HEART RATE: 73 BPM | HEIGHT: 63 IN | SYSTOLIC BLOOD PRESSURE: 149 MMHG | BODY MASS INDEX: 31.22 KG/M2 | DIASTOLIC BLOOD PRESSURE: 82 MMHG | OXYGEN SATURATION: 98 %

## 2025-02-10 DIAGNOSIS — Z86.39 HISTORY OF DIABETES MELLITUS: Primary | ICD-10-CM

## 2025-02-10 DIAGNOSIS — D64.9 ANEMIA, UNSPECIFIED TYPE: ICD-10-CM

## 2025-02-10 DIAGNOSIS — R30.0 BURNING WITH URINATION: ICD-10-CM

## 2025-02-10 DIAGNOSIS — Z98.84 GASTRIC BYPASS STATUS FOR OBESITY: ICD-10-CM

## 2025-02-10 DIAGNOSIS — R10.30 LOWER ABDOMINAL PAIN: ICD-10-CM

## 2025-02-10 DIAGNOSIS — R11.2 NAUSEA AND VOMITING, UNSPECIFIED VOMITING TYPE: ICD-10-CM

## 2025-02-10 DIAGNOSIS — K21.9 GASTROESOPHAGEAL REFLUX DISEASE, UNSPECIFIED WHETHER ESOPHAGITIS PRESENT: ICD-10-CM

## 2025-02-10 LAB
BILIRUB BLD-MCNC: ABNORMAL MG/DL
CLARITY, POC: ABNORMAL
COLOR UR: ABNORMAL
DEPRECATED RDW RBC AUTO: 38 FL (ref 37–54)
ERYTHROCYTE [DISTWIDTH] IN BLOOD BY AUTOMATED COUNT: 14.9 % (ref 12.3–15.4)
EXPIRATION DATE: ABNORMAL
EXPIRATION DATE: ABNORMAL
GLUCOSE UR STRIP-MCNC: NEGATIVE MG/DL
HBA1C MFR BLD: 5.9 % (ref 4.5–5.7)
HCT VFR BLD AUTO: 38 % (ref 34–46.6)
HGB BLD-MCNC: 11.8 G/DL (ref 12–15.9)
KETONES UR QL: ABNORMAL
LEUKOCYTE EST, POC: ABNORMAL
Lab: ABNORMAL
Lab: ABNORMAL
MCH RBC QN AUTO: 22.6 PG (ref 26.6–33)
MCHC RBC AUTO-ENTMCNC: 31.1 G/DL (ref 31.5–35.7)
MCV RBC AUTO: 72.7 FL (ref 79–97)
NITRITE UR-MCNC: NEGATIVE MG/ML
PH UR: 5.5 [PH] (ref 5–8)
PLATELET # BLD AUTO: 456 10*3/MM3 (ref 140–450)
PMV BLD AUTO: 10.3 FL (ref 6–12)
PROT UR STRIP-MCNC: ABNORMAL MG/DL
RBC # BLD AUTO: 5.23 10*6/MM3 (ref 3.77–5.28)
RBC # UR STRIP: ABNORMAL /UL
SP GR UR: 1.03 (ref 1–1.03)
UROBILINOGEN UR QL: NORMAL
WBC NRBC COR # BLD AUTO: 9.3 10*3/MM3 (ref 3.4–10.8)

## 2025-02-10 PROCEDURE — 83690 ASSAY OF LIPASE: CPT

## 2025-02-10 PROCEDURE — 3077F SYST BP >= 140 MM HG: CPT | Performed by: INTERNAL MEDICINE

## 2025-02-10 PROCEDURE — 3044F HG A1C LEVEL LT 7.0%: CPT | Performed by: INTERNAL MEDICINE

## 2025-02-10 PROCEDURE — 36415 COLL VENOUS BLD VENIPUNCTURE: CPT

## 2025-02-10 PROCEDURE — 82043 UR ALBUMIN QUANTITATIVE: CPT | Performed by: INTERNAL MEDICINE

## 2025-02-10 PROCEDURE — 1125F AMNT PAIN NOTED PAIN PRSNT: CPT | Performed by: INTERNAL MEDICINE

## 2025-02-10 PROCEDURE — 81003 URINALYSIS AUTO W/O SCOPE: CPT | Performed by: INTERNAL MEDICINE

## 2025-02-10 PROCEDURE — 84466 ASSAY OF TRANSFERRIN: CPT

## 2025-02-10 PROCEDURE — 83036 HEMOGLOBIN GLYCOSYLATED A1C: CPT | Performed by: INTERNAL MEDICINE

## 2025-02-10 PROCEDURE — 82570 ASSAY OF URINE CREATININE: CPT | Performed by: INTERNAL MEDICINE

## 2025-02-10 PROCEDURE — 83540 ASSAY OF IRON: CPT

## 2025-02-10 PROCEDURE — 80053 COMPREHEN METABOLIC PANEL: CPT

## 2025-02-10 PROCEDURE — 99214 OFFICE O/P EST MOD 30 MIN: CPT | Performed by: INTERNAL MEDICINE

## 2025-02-10 PROCEDURE — 85027 COMPLETE CBC AUTOMATED: CPT

## 2025-02-10 PROCEDURE — 3079F DIAST BP 80-89 MM HG: CPT | Performed by: INTERNAL MEDICINE

## 2025-02-10 RX ORDER — ONDANSETRON 4 MG/1
4 TABLET, ORALLY DISINTEGRATING ORAL EVERY 8 HOURS PRN
Qty: 20 TABLET | Refills: 1 | Status: SHIPPED | OUTPATIENT
Start: 2025-02-10

## 2025-02-10 RX ORDER — PANTOPRAZOLE SODIUM 40 MG/1
40 TABLET, DELAYED RELEASE ORAL DAILY
Qty: 90 TABLET | Refills: 1 | Status: SHIPPED | OUTPATIENT
Start: 2025-02-10

## 2025-02-10 NOTE — PROGRESS NOTES
Mallorie Dover  1980  [id number]  Patient Care Team:  Doug Worrell MD as PCP - General (Internal Medicine)  Pratik Edward MD as Consulting Physician (Pulmonary Disease)  Doug Byrnes MD as Consulting Physician (Orthopedic Surgery)    Mallorie Dover is a 44 y.o. female here today for follow up.     This patient is accompanied by their self who contributes to the history of their care.    Chief Complaint:    Chief Complaint   Patient presents with    Urinary Tract Infection    Abdominal Pain     Can not eat    heartburn        History of Present Illness:  I have reviewed and/or updated the patient's past medical, past surgical, family, social history, problem list and allergies as appropriate.     Reports and with abdominal pain to the point where she cannot eat.  She also is having burning with urination and heartburn.  She is on Prilosec long-term 40 mg daily for reflux. Indicates a decreased appetite for months. She developed abdominal pain 2 weeks ago- has history of gastro bypass.  She complains of significant inability to pass stool recently she did go 1.5 weeks without a BM. Typically she has had nausea and vomiting. 2 episodes of emeisis  in past 2 months without blood. Her urine turned very dark yesterday. No blood in her urine. Her last bm was force this past week with castor oil.- Stool was yellow in color- solid. Pain is sharp. Eases with BM.  Over the past several weeks her reflux has been unmanageable.  There have been no fevers or chills    No polyuria, dipsia, there has been some burning with urination. No hematuria. Denies fevers/chills  sHe has been wanting to resume ibuprofen-- has been on meloxicam    Review of Systems   Constitutional:  Positive for fatigue. Negative for chills and fever.   Respiratory: Negative.     Cardiovascular:  Negative for chest pain, palpitations and leg swelling.   Gastrointestinal:  Positive for abdominal pain, nausea,  "vomiting, GERD and indigestion.   Endocrine: Negative.    Genitourinary:  Positive for dysuria. Negative for flank pain.        Brown urine x 1-2 days   Neurological: Negative.        Vitals:    02/10/25 1337   BP: 149/82   Pulse: 73   SpO2: 98%   Weight: 79.9 kg (176 lb 3.2 oz)   Height: 160 cm (62.99\")     Body mass index is 31.22 kg/m².    Physical Exam  Vitals and nursing note reviewed.   Constitutional:       General: She is not in acute distress.     Appearance: She is well-developed. She is not diaphoretic.      Comments: Pale appearing slightly dry mucosa   HENT:      Head: Normocephalic and atraumatic.      Right Ear: External ear normal.      Left Ear: External ear normal.      Mouth/Throat:      Pharynx: No oropharyngeal exudate.   Eyes:      General: No scleral icterus.        Right eye: No discharge.      Conjunctiva/sclera: Conjunctivae normal.   Neck:      Thyroid: No thyromegaly.      Vascular: No JVD.      Trachea: No tracheal deviation.   Cardiovascular:      Rate and Rhythm: Normal rate and regular rhythm.      Heart sounds: Normal heart sounds.      Comments: PMI nondisplaced  Pulmonary:      Effort: Pulmonary effort is normal.      Breath sounds: Normal breath sounds. No wheezing or rales.   Abdominal:      General: Bowel sounds are normal.      Palpations: Abdomen is soft.      Tenderness: There is abdominal tenderness. There is no guarding or rebound.      Comments: Decreased abdominal sounds. Ttp wit right sided rebound. + ruq ttp with insp arrest.    Musculoskeletal:      Cervical back: Normal range of motion and neck supple.   Lymphadenopathy:      Cervical: No cervical adenopathy.   Skin:     General: Skin is warm and dry.      Capillary Refill: Capillary refill takes less than 2 seconds.      Coloration: Skin is not pale.      Findings: No rash.   Neurological:      Mental Status: She is alert and oriented to person, place, and time.      Motor: No abnormal muscle tone.      Coordination: " Coordination normal.   Psychiatric:         Mood and Affect: Mood normal.         Behavior: Behavior normal.         Judgment: Judgment normal.         Procedures    Results Review:  ponent  Ref Range & Units 13:52  (2/10/25) 2 mo ago  (12/3/24) 11 mo ago  (2/21/24)   Hemoglobin A1C  4.5 - 5.7 % 5.9 Abnormal   5.7     en Information: Urine   0 Result Notes        Component  Ref Range & Units 14:03  (2/10/25)   Color  Yellow, Straw, Dark Yellow, Sarah Other Abnormal    Clarity, UA  Clear Turbid Abnormal    Specific Gravity  1.005 - 1.030 1.030   pH, Urine  5.0 - 8.0 5.5   Leukocytes  Negative Trace Abnormal    Nitrite, UA  Negative Negative   Protein, POC  Negative mg/dL 1+ Abnormal    Glucose, UA  Negative mg/dL Negative   Ketones, UA  Negative Trace Abnormal    Urobilinogen, UA  Normal, 0.2 E.U./dL Normal   Bilirubin  Negative Large (3+) Abnormal    Blood, UA  Negative Trace Abnormal    Lot Number [id number]   Expiration Date 01/14/2026   Hemoglobin A1C           None    Assessment/Plan:    Problem List Items Addressed This Visit    None  Visit Diagnoses       History of diabetes mellitus    -  Primary    Relevant Orders    POC Glycosylated Hemoglobin (Hb A1C) (Completed)    POC Urinalysis Dipstick, Automated (Completed)    Burning with urination        Relevant Orders    Comprehensive Metabolic Panel    CBC (No Diff)    Urine Culture - Urine, Urine, Clean Catch    Urinalysis With Microscopic - Urine, Clean Catch    Lower abdominal pain        Relevant Orders    Comprehensive Metabolic Panel    US Gallbladder    Lipase    CT Abdomen Pelvis Without Contrast    Gastroesophageal reflux disease, unspecified whether esophagitis present        Relevant Medications    pantoprazole (PROTONIX) 40 MG EC tablet    Other Relevant Orders    Ambulatory Referral to Gastroenterology    Nausea and vomiting, unspecified vomiting type        Relevant Medications    pantoprazole (PROTONIX) 40 MG EC tablet    ondansetron ODT  (ZOFRAN-ODT) 4 MG disintegrating tablet    Other Relevant Orders    Ambulatory Referral to Gastroenterology    US Gallbladder    Lipase    CT Abdomen Pelvis Without Contrast        Most concerned about a partial or small bowel obstruction given her gastric bypass surgery,?  Internal hernia.  CT of the abdomen pelvis requested.  She also has gallbladder still intact.  Gallbladder ultrasound has been requested however CT will be done stat.  Labs as above.  I have had her stop her meloxicam and stop ibuprofen.  Change Prilosec to Protonix.  GI referral for upper endoscopy.  Urine microscopic was requested as well.    Plan of care reviewed with patient at the conclusion of today's visit. Education was provided regarding diagnosis and management.  Patient verbalizes understanding of and agreement with management plan.    Return if symptoms worsen or fail to improve, for Follow up after testing.    Doug Worrell MD      Please note than portions of this note were completed [facility] a Voice Recognition Program

## 2025-02-11 ENCOUNTER — TELEPHONE (OUTPATIENT)
Dept: PHYSICAL THERAPY | Facility: OTHER | Age: 45
End: 2025-02-11
Payer: MEDICARE

## 2025-02-11 DIAGNOSIS — D64.9 ANEMIA, UNSPECIFIED TYPE: Primary | ICD-10-CM

## 2025-02-11 LAB
ALBUMIN SERPL-MCNC: 4.2 G/DL (ref 3.5–5.2)
ALBUMIN UR-MCNC: 2.6 MG/DL
ALBUMIN/GLOB SERPL: 1.4 G/DL
ALP SERPL-CCNC: 94 U/L (ref 39–117)
ALT SERPL W P-5'-P-CCNC: 22 U/L (ref 1–33)
ANION GAP SERPL CALCULATED.3IONS-SCNC: 9 MMOL/L (ref 5–15)
AST SERPL-CCNC: 26 U/L (ref 1–32)
BILIRUB SERPL-MCNC: <0.2 MG/DL (ref 0–1.2)
BUN SERPL-MCNC: 21 MG/DL (ref 6–20)
BUN/CREAT SERPL: 27.3 (ref 7–25)
CALCIUM SPEC-SCNC: 9.1 MG/DL (ref 8.6–10.5)
CHLORIDE SERPL-SCNC: 108 MMOL/L (ref 98–107)
CO2 SERPL-SCNC: 21 MMOL/L (ref 22–29)
CREAT SERPL-MCNC: 0.77 MG/DL (ref 0.57–1)
CREAT UR-MCNC: 316.1 MG/DL
EGFRCR SERPLBLD CKD-EPI 2021: 96.5 ML/MIN/1.73
GLOBULIN UR ELPH-MCNC: 2.9 GM/DL
GLUCOSE SERPL-MCNC: 82 MG/DL (ref 65–99)
IRON 24H UR-MRATE: 17 MCG/DL (ref 37–145)
IRON SATN MFR SERPL: 4 % (ref 20–50)
LIPASE SERPL-CCNC: 23 U/L (ref 13–60)
MICROALBUMIN/CREAT UR: 8.2 MG/G (ref 0–29)
POTASSIUM SERPL-SCNC: 4 MMOL/L (ref 3.5–5.2)
PROT SERPL-MCNC: 7.1 G/DL (ref 6–8.5)
SODIUM SERPL-SCNC: 138 MMOL/L (ref 136–145)
TIBC SERPL-MCNC: 446 MCG/DL (ref 298–536)
TRANSFERRIN SERPL-MCNC: 299 MG/DL (ref 200–360)

## 2025-02-11 NOTE — PROGRESS NOTES
Labs are not alarming..shebappears dehydrated. Liver and kidneys look ok. Slight anemia- likely iron deficient from prior weight loss surgery- still needs to see gi for upper endoscopy. Awaits ct of abdomen that was [id number] yesterday. No cause of abd pain identified by labs

## 2025-02-11 NOTE — TELEPHONE ENCOUNTER
Caller: Mallorie Dover    Relationship: Self    What was the call regarding: PATIENT CANCELLED TODAYS APPT BECAUSE OF THE WEATHER       Diagnosis: M25.512 Left shoulder pain  Authorized # of Visits:  12 [employer]         Next MD visit: none scheduled  Fall Risk: standard         Precautions: None significant           Medication Changes since last visit?: No  Subjective: Pt states that L sh scalene stretch with self OP 15\" x 5   -Seated retraction with ext x 10-incr L UE  -Seated thoracic ext over ball on chair 2 x 10 with cervical support with towel  -Seated auto traction.  EX:  -Cervical retract x 10-NE   -Cerv retract with therapist OP-NE manual therapy, balance and proprioception training. .        Charges:  Therex2, man x 1       Total Timed Treatment: 42min  Total Treatment Time: 44 min

## 2025-02-12 ENCOUNTER — TELEPHONE (OUTPATIENT)
Dept: FAMILY MEDICINE CLINIC | Facility: CLINIC | Age: 45
End: 2025-02-12
Payer: MEDICARE

## 2025-02-12 DIAGNOSIS — E61.1 IRON DEFICIENCY: Primary | ICD-10-CM

## 2025-02-12 RX ORDER — FERROUS SULFATE 325(65) MG
325 TABLET ORAL
Qty: 90 TABLET | Refills: 1 | Status: SHIPPED | OUTPATIENT
Start: 2025-02-12

## 2025-02-12 NOTE — TELEPHONE ENCOUNTER
Doug Worrell MD  2/12/2025 10:02 AM EST Back to Top      She is iron deficient, needs to take oral iron.  Have sent in a prescription.  Awaiting approval for gastro referral.  I would avoid meloxicam until after she sees GI.     Read off the labs to patient.    Patient is concerned with her diet regarding her liver. She would like to know what she can eat and not eat that would help her and not upset her stomach.

## 2025-02-12 NOTE — TELEPHONE ENCOUNTER
Caller: Mallorie Dover    Relationship: Self    Best call back number: [phone]    Caller requesting test results: SELF    What test was performed: LABS    When was the test performed: 02/10/2025    Additional notes: PATIENT IS CONCERNED ABOUT HER RECENT LAB RESULTS. PATIENT WOULD LIKE TO SPEAK TO DR WHITAKER ABOUT HER ABNORMAL LABS. PLEASE CALL PATIENT BACK OR RAYMOND COLON [phone]

## 2025-02-12 NOTE — PROGRESS NOTES
She is iron deficient, needs to take oral iron.  Have sent in a prescription.  Awaiting approval for gastro referral.  I would avoid meloxicam until after she sees GI.

## 2025-02-13 NOTE — TELEPHONE ENCOUNTER
Doug Worrell MD  You23 hours ago (11:53 AM)        A bland diet. Her liver chemistrys looked fine. Did she get the CT ? She will need to see GI as welll whch was ordered   Informed patient of recommendations. She does have the CT scan scheduled in a couple days and plans to go to it. Gastro is also schedule in March. She wanted Dr Worrell to know she was able to have a bowel movement on her own and it was yellow in color. She did stop taking the NSAIDS

## 2025-02-17 ENCOUNTER — HOSPITAL ENCOUNTER (OUTPATIENT)
Dept: CT IMAGING | Facility: HOSPITAL | Age: 45
Discharge: HOME | End: 2025-02-17
Admitting: INTERNAL MEDICINE
Payer: MEDICARE

## 2025-02-17 DIAGNOSIS — R11.2 NAUSEA AND VOMITING, UNSPECIFIED VOMITING TYPE: ICD-10-CM

## 2025-02-17 DIAGNOSIS — R10.30 LOWER ABDOMINAL PAIN: ICD-10-CM

## 2025-02-17 PROCEDURE — 74176 CT ABD & PELVIS W/O CONTRAST: CPT

## 2025-02-17 PROCEDURE — 25510000002 DIATRIZOATE MEGLUMINE & SODIUM PER 1 ML: Performed by: INTERNAL MEDICINE

## 2025-02-17 RX ORDER — DIATRIZOATE MEGLUMINE AND DIATRIZOATE SODIUM 660; 100 MG/ML; MG/ML
15 SOLUTION ORAL; RECTAL ONCE
Status: COMPLETED | OUTPATIENT
Start: 2025-02-17 | End: 2025-02-17

## 2025-02-17 RX ADMIN — DIATRIZOATE MEGLUMINE AND DIATRIZOATE SODIUM 15 ML: 660; 100 LIQUID ORAL; RECTAL at 12:45

## 2025-02-17 NOTE — PROGRESS NOTES
Within a moderate level of: Stool, CT her abdomen was entirely unremarkable.  Try MiraLAX 17 g daily.  Await GI referral

## 2025-02-25 ENCOUNTER — TELEPHONE (OUTPATIENT)
Dept: FAMILY MEDICINE CLINIC | Facility: CLINIC | Age: 45
End: 2025-02-25
Payer: MEDICARE

## 2025-02-25 NOTE — TELEPHONE ENCOUNTER
Lou from [facility] called to confirm if patient was still taking Advair Diskus. I did inform her this was active on her list. She stated that this is not on the preferred formulary and that if we wanted to switch it Fluticasone and Wixela are preferred.

## 2025-03-17 RX ORDER — IBUPROFEN 800 MG/1
800 TABLET, FILM COATED ORAL EVERY 12 HOURS
Qty: 60 TABLET | Refills: 9 | OUTPATIENT
Start: 2025-03-17

## 2025-03-18 ENCOUNTER — TELEPHONE (OUTPATIENT)
Dept: FAMILY MEDICINE CLINIC | Facility: CLINIC | Age: 45
End: 2025-03-18

## 2025-03-18 RX ORDER — IBUPROFEN 800 MG/1
800 TABLET, FILM COATED ORAL EVERY 8 HOURS PRN
Qty: 270 TABLET | Refills: 2 | Status: SHIPPED | OUTPATIENT
Start: 2025-03-18

## 2025-03-18 NOTE — TELEPHONE ENCOUNTER
Caller: Mallorie Dover    Relationship: Self    Best call back number: [phone]     What medication are you requesting: IBUPROFEN 800MG        If a prescription is needed, what is your preferred pharmacy and phone number: [facility] #[id number] - [place], [place] - [address]  AT [facility] [place]  & [address] [facility] - [phone] [facility] [phone]      Additional notes:PATIENT STATED SINCE SHE LAST SAW THE PCP ON 2/10/25 SHE STOPPED TYLENOL AND ALL PAIN MEDS. SHE STATED THE MELOXICAM DOES ABSOLUTELY NOTHING FOR HER AND WOULD LIKE TO GO BACK ON THE IBUPROFEN.PLEASE SEND TO [facility] [address] [facility]

## 2025-03-28 NOTE — TELEPHONE ENCOUNTER
Caller: DoverMallorie    Relationship: Self    Best call back number: [phone]     Requested Prescriptions:   Requested Prescriptions     Pending Prescriptions Disp Refills    tiZANidine (ZANAFLEX) 2 MG tablet 180 tablet 0     Sig: Take 1 tablet by mouth Every 12 (Twelve) Hours As Needed for Muscle Spasms.        Pharmacy where request should be sent: [facility][id number] [place], [place] - [address] [facility]  AT [facility]  & [address] [facility] [phone] [facility] [phone] FX     Last office visit with prescribing clinician: 2/10/2025   Last telemedicine visit with prescribing clinician: Visit date not found   Next office visit with prescribing clinician: Visit date not found     Additional details provided by patient: PATIENT IS COMPLETELY OUT OF MEDICATION.    Does the patient have less than a 3 day supply:  [x] Yes  [] No    Would you like a call back once the refill request has been completed: [] Yes [x] No    If the office needs to give you a call back, can they leave a voicemail: [] Yes [x] No    Selena Vargas Rep   03/28/25 10:57 EDT

## 2025-03-31 RX ORDER — TIZANIDINE 2 MG/1
2 TABLET ORAL EVERY 12 HOURS PRN
Qty: 180 TABLET | Refills: 0 | OUTPATIENT
Start: 2025-03-31

## 2025-03-31 NOTE — TELEPHONE ENCOUNTER
Rx Refill Note  Requested Prescriptions     Pending Prescriptions Disp Refills    tiZANidine (ZANAFLEX) 2 MG tablet 180 tablet 0     Sig: Take 1 tablet by mouth Every 12 (Twelve) Hours As Needed for Muscle Spasms.      Last office visit with prescribing clinician: 2/10/2025   Last telemedicine visit with prescribing clinician: Visit date not found   Next office visit with prescribing clinician: Visit date not found                         Would you like a call back once the refill request has been completed: [] Yes [] No    If the office needs to give you a call back, can they leave a voicemail: [] Yes [] No    Bhavna Hannah MA  03/31/25, 10:03 EDT

## 2025-04-03 ENCOUNTER — TELEPHONE (OUTPATIENT)
Dept: ORTHOPEDIC SURGERY | Facility: CLINIC | Age: 45
End: 2025-04-03
Payer: MEDICARE

## 2025-04-03 ENCOUNTER — OFFICE VISIT (OUTPATIENT)
Dept: ORTHOPEDIC SURGERY | Facility: CLINIC | Age: 45
End: 2025-04-03
Payer: MEDICARE

## 2025-04-03 VITALS
SYSTOLIC BLOOD PRESSURE: 140 MMHG | WEIGHT: 178 LBS | DIASTOLIC BLOOD PRESSURE: 70 MMHG | BODY MASS INDEX: 31.54 KG/M2 | HEIGHT: 63 IN

## 2025-04-03 DIAGNOSIS — M25.512 ACUTE PAIN OF BOTH SHOULDERS: ICD-10-CM

## 2025-04-03 DIAGNOSIS — M54.12 CERVICAL RADICULOPATHY: ICD-10-CM

## 2025-04-03 DIAGNOSIS — M75.52 BURSITIS OF LEFT SHOULDER: ICD-10-CM

## 2025-04-03 DIAGNOSIS — M75.42 IMPINGEMENT SYNDROME OF LEFT SHOULDER: Primary | ICD-10-CM

## 2025-04-03 DIAGNOSIS — M25.511 ACUTE PAIN OF BOTH SHOULDERS: ICD-10-CM

## 2025-04-03 PROCEDURE — 99213 OFFICE O/P EST LOW 20 MIN: CPT

## 2025-04-03 PROCEDURE — 20610 DRAIN/INJ JOINT/BURSA W/O US: CPT

## 2025-04-03 PROCEDURE — 3077F SYST BP >= 140 MM HG: CPT

## 2025-04-03 PROCEDURE — 3078F DIAST BP <80 MM HG: CPT

## 2025-04-03 RX ORDER — OMEPRAZOLE 40 MG/1
1 CAPSULE, DELAYED RELEASE ORAL DAILY
COMMUNITY
Start: 2025-02-23

## 2025-04-03 RX ORDER — CETIRIZINE HYDROCHLORIDE 10 MG/1
10 TABLET ORAL DAILY
Qty: 90 TABLET | Refills: 0 | Status: SHIPPED | OUTPATIENT
Start: 2025-04-03

## 2025-04-03 RX ORDER — TIZANIDINE 2 MG/1
2 TABLET ORAL EVERY 12 HOURS PRN
Qty: 180 TABLET | Refills: 0 | Status: SHIPPED | OUTPATIENT
Start: 2025-04-03

## 2025-04-03 RX ADMIN — TRIAMCINOLONE ACETONIDE 40 MG: 40 INJECTION, SUSPENSION INTRA-ARTICULAR; INTRAMUSCULAR at 15:06

## 2025-04-03 RX ADMIN — LIDOCAINE HYDROCHLORIDE 5 ML: 10 INJECTION, SOLUTION EPIDURAL; INFILTRATION; INTRACAUDAL; PERINEURAL at 15:06
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
## 2025-04-03 NOTE — TELEPHONE ENCOUNTER
Called patient and informed her she could come in tomorrow to  a new right wrist carpal tunnel brace  from [facility], she understood and thanked me.    Carlos LANGE CMA

## 2025-04-03 NOTE — TELEPHONE ENCOUNTER
Provider: DR. SILVER    Caller: Mallorie Dover    Relationship to Patient: Self    Phone Number: [phone]    Reason for Call: PATIENT CALLED WANTING TO SEE IF DR. SILVER COULD ORDER HER A BRACE FOR THE RIGHT HAND. DX: CARPAL TUNNEL AND CUBITAL TUNNEL SYNDROME. PLEASE ADVISE.

## 2025-04-03 NOTE — PROGRESS NOTES
Procedure   - Large Joint Arthrocentesis: L subacromial bursa on 4/3/2025 3:06 PM  Indications: pain  Details: 21 G needle, posterior approach  Medications: 5 mL lidocaine PF 1% 1 %; 40 mg triamcinolone acetonide 40 MG/ML  Outcome: tolerated well, no immediate complications  Procedure, treatment alternatives, risks and benefits explained, specific risks discussed. Consent was given by the patient. Immediately prior to procedure a time out was called to verify the correct patient, procedure, equipment, support staff and site/side marked as required. Patient was prepped and draped in the usual sterile fashion.

## 2025-04-03 NOTE — TELEPHONE ENCOUNTER
Caller: Mallorie Dover    Relationship: Self    Best call back number: [phone]     Requested Prescriptions:   Requested Prescriptions     Pending Prescriptions Disp Refills    tiZANidine (ZANAFLEX) 2 MG tablet 180 tablet 0     Sig: Take 1 tablet by mouth Every 12 (Twelve) Hours As Needed for Muscle Spasms.    cetirizine (zyrTEC) 10 MG tablet       Sig: Take 1 tablet by mouth Daily.        Pharmacy where request should be sent: [facility] #[id number] [address] [place] [address]  AT [facility]  & [address] [facility] - [phone]  - [phone] FX     Last office visit with prescribing clinician: 2/10/2025   Last telemedicine visit with prescribing clinician: Visit date not found   Next office visit with prescribing clinician: Visit date not found     Additional details provided by patient: OUT OF MEDICATION      Does the patient have less than a 3 day supply:  [x] Yes  [] No    Would you like a call back once the refill request has been completed: [] Yes [x] No    If the office needs to give you a call back, can they leave a voicemail: [] Yes [x] No    Selena Walker   04/03/25 12:40 EDT

## 2025-04-07 RX ORDER — LIDOCAINE HYDROCHLORIDE 10 MG/ML
5 INJECTION, SOLUTION EPIDURAL; INFILTRATION; INTRACAUDAL; PERINEURAL
Status: COMPLETED | OUTPATIENT
Start: 2025-04-03 | End: 2025-04-03

## 2025-04-07 RX ORDER — TRIAMCINOLONE ACETONIDE 40 MG/ML
40 INJECTION, SUSPENSION INTRA-ARTICULAR; INTRAMUSCULAR
Status: COMPLETED | OUTPATIENT
Start: 2025-04-03 | End: 2025-04-03

## 2025-04-09 ENCOUNTER — TELEPHONE (OUTPATIENT)
Dept: FAMILY MEDICINE CLINIC | Facility: CLINIC | Age: 45
End: 2025-04-09
Payer: MEDICARE

## 2025-04-09 NOTE — TELEPHONE ENCOUNTER
Caller: Mallorie Dover    Relationship: Self    Best call back number: [phone]     What is the medical concern/diagnosis: ARTHRITIS IN KNEES, OSTEOPOROSIS IN SHOULDER SEVERE KNEE AND HIP PAIN    What specialty or service is being requested:ORTHO (WHATEVER SPECIALIST FOR THOSE CONDITIONS)     What is the provider, practice or medical service name: [facility]    What is the office location: [place]      Any additional details: HISTORY OF THESE CONDITIONS THAT PCP IS AWARE OF.

## 2025-04-10 NOTE — TELEPHONE ENCOUNTER
Informed patient she's already established with ortho and can schedule with them. No further questions or concerns.

## 2025-04-11 ENCOUNTER — HOSPITAL ENCOUNTER (OUTPATIENT)
Dept: SLEEP MEDICINE | Facility: HOSPITAL | Age: 45
Discharge: HOME | End: 2025-04-11
Admitting: INTERNAL MEDICINE
Payer: MEDICARE

## 2025-04-11 VITALS — BODY MASS INDEX: 31.52 KG/M2 | HEIGHT: 63 IN | WEIGHT: 177.91 LBS

## 2025-04-11 DIAGNOSIS — G47.33 OSA ON CPAP: ICD-10-CM

## 2025-04-11 PROCEDURE — 95800 SLP STDY UNATTENDED: CPT

## 2025-04-15 PROCEDURE — 95800 SLP STDY UNATTENDED: CPT | Performed by: INTERNAL MEDICINE

## 2025-04-16 ENCOUNTER — TELEPHONE (OUTPATIENT)
Dept: SLEEP MEDICINE | Age: 45
End: 2025-04-16
Payer: MEDICARE

## 2025-04-16 NOTE — TELEPHONE ENCOUNTER
Left voice message for patient to give us a call back at the office in reference to their sleep study results.

## 2025-05-13 RX ORDER — TIZANIDINE 2 MG/1
2 TABLET ORAL EVERY 12 HOURS PRN
Qty: 180 TABLET | Refills: 0 | OUTPATIENT
Start: 2025-05-13

## 2025-05-20 RX ORDER — TIZANIDINE 2 MG/1
2 TABLET ORAL EVERY 12 HOURS PRN
Qty: 180 TABLET | Refills: 0 | Status: SHIPPED | OUTPATIENT
Start: 2025-05-20

## 2025-05-20 NOTE — TELEPHONE ENCOUNTER
Rx Refill Note  Requested Prescriptions     Pending Prescriptions Disp Refills    tiZANidine (ZANAFLEX) 2 MG tablet [Pharmacy Med Name: TIZANIDINE 2MG TABLETS] 180 tablet 0     Sig: TAKE 1 TABLET BY MOUTH EVERY 12 HOURS AS NEEDED FOR MUSCLE SPASMS      Last office visit with prescribing clinician: 2/10/2025   Next office visit with prescribing clinician: Return if symptoms worsen or fail to improve, for Follow up after testing.     Cheli Babcock MA  05/20/25, 16:20 EDT

## 2025-05-21 ENCOUNTER — OFFICE VISIT (OUTPATIENT)
Dept: OBSTETRICS AND GYNECOLOGY | Facility: CLINIC | Age: 45
End: 2025-05-21
Payer: MEDICARE

## 2025-05-21 VITALS
WEIGHT: 177 LBS | SYSTOLIC BLOOD PRESSURE: 140 MMHG | DIASTOLIC BLOOD PRESSURE: 78 MMHG | HEIGHT: 63 IN | BODY MASS INDEX: 31.36 KG/M2

## 2025-05-21 DIAGNOSIS — R30.0 DYSURIA: ICD-10-CM

## 2025-05-21 DIAGNOSIS — Z12.4 SCREENING FOR CERVICAL CANCER: ICD-10-CM

## 2025-05-21 DIAGNOSIS — N63.15 BREAST LUMP ON RIGHT SIDE AT 3 O'CLOCK POSITION: ICD-10-CM

## 2025-05-21 DIAGNOSIS — N76.0 ACUTE VAGINITIS: Primary | ICD-10-CM

## 2025-05-21 DIAGNOSIS — L68.0 FEMALE HIRSUTISM: ICD-10-CM

## 2025-05-21 DIAGNOSIS — Z12.31 ENCOUNTER FOR SCREENING MAMMOGRAM FOR BREAST CANCER: ICD-10-CM

## 2025-05-21 LAB
BILIRUB BLD-MCNC: NEGATIVE MG/DL
CLARITY, POC: CLEAR
COLOR UR: YELLOW
GLUCOSE UR STRIP-MCNC: NEGATIVE MG/DL
KETONES UR QL: NEGATIVE
LEUKOCYTE EST, POC: NEGATIVE
NITRITE UR-MCNC: NEGATIVE MG/ML
PH UR: 6 [PH] (ref 5–8)
PROT UR STRIP-MCNC: NEGATIVE MG/DL
RBC # UR STRIP: ABNORMAL /UL
SP GR UR: 1.01 (ref 1–1.03)
UROBILINOGEN UR QL: ABNORMAL

## 2025-05-21 NOTE — ASSESSMENT & PLAN NOTE
Long history of hirsutism.  Discussed possible spironolactone use.  Recheck testosterone today and will proceed from there.
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
## 2025-05-22 ENCOUNTER — RESULTS FOLLOW-UP (OUTPATIENT)
Dept: OBSTETRICS AND GYNECOLOGY | Facility: CLINIC | Age: 45
End: 2025-05-22
Payer: MEDICARE

## 2025-05-22 LAB — REF LAB TEST METHOD: NORMAL

## 2025-05-23 LAB
A VAGINAE DNA VAG QL NAA+PROBE: NORMAL SCORE
BVAB2 DNA VAG QL NAA+PROBE: NORMAL SCORE
C ALBICANS DNA VAG QL NAA+PROBE: NEGATIVE
C GLABRATA DNA VAG QL NAA+PROBE: NEGATIVE
C KRUSEI DNA VAG QL NAA+PROBE: NEGATIVE
C LUSITANIAE DNA VAG QL NAA+PROBE: NEGATIVE
C TRACH DNA SPEC QL NAA+PROBE: NEGATIVE
CANDIDA DNA VAG QL NAA+PROBE: NEGATIVE
MEGA1 DNA VAG QL NAA+PROBE: NORMAL SCORE
N GONORRHOEA DNA VAG QL NAA+PROBE: NEGATIVE
T VAGINALIS DNA VAG QL NAA+PROBE: NEGATIVE

## 2025-05-28 LAB — TESTOST SERPL-MCNC: 20 NG/DL

## 2025-06-03 ENCOUNTER — HOSPITAL ENCOUNTER (OUTPATIENT)
Dept: ULTRASOUND IMAGING | Facility: HOSPITAL | Age: 45
Discharge: HOME | End: 2025-06-03
Payer: MEDICARE

## 2025-06-03 ENCOUNTER — HOSPITAL ENCOUNTER (OUTPATIENT)
Dept: MAMMOGRAPHY | Facility: HOSPITAL | Age: 45
Discharge: HOME | End: 2025-06-03
Payer: MEDICARE

## 2025-06-03 DIAGNOSIS — Z12.31 ENCOUNTER FOR SCREENING MAMMOGRAM FOR BREAST CANCER: ICD-10-CM

## 2025-06-03 DIAGNOSIS — N63.15 BREAST LUMP ON RIGHT SIDE AT 3 O'CLOCK POSITION: ICD-10-CM

## 2025-06-03 PROCEDURE — 76642 ULTRASOUND BREAST LIMITED: CPT | Performed by: RADIOLOGY

## 2025-06-03 PROCEDURE — G0279 TOMOSYNTHESIS, MAMMO: HCPCS

## 2025-06-03 PROCEDURE — 77066 DX MAMMO INCL CAD BI: CPT

## 2025-06-03 PROCEDURE — G0279 TOMOSYNTHESIS, MAMMO: HCPCS | Performed by: RADIOLOGY

## 2025-06-03 PROCEDURE — 77066 DX MAMMO INCL CAD BI: CPT | Performed by: RADIOLOGY

## 2025-06-03 PROCEDURE — 76642 ULTRASOUND BREAST LIMITED: CPT

## 2025-06-09 ENCOUNTER — TELEPHONE (OUTPATIENT)
Dept: FAMILY MEDICINE CLINIC | Facility: CLINIC | Age: 45
End: 2025-06-09

## 2025-06-09 RX ORDER — IPRATROPIUM BROMIDE AND ALBUTEROL SULFATE 2.5; .5 MG/3ML; MG/3ML
3 SOLUTION RESPIRATORY (INHALATION) EVERY 4 HOURS PRN
Qty: 360 ML | Refills: 3 | Status: SHIPPED | OUTPATIENT
Start: 2025-06-09 | End: 2025-06-11 | Stop reason: SDUPTHER

## 2025-06-09 NOTE — TELEPHONE ENCOUNTER
Caller: Mallorie Dover    Relationship: Self    Best call back number: [phone]     What medication are you requesting: ALBUTEROL INHALER SOLUTION IPRATROPIUM BROMIDE .5MG AND ALBUTEROL SULFATE 3MG ALL IN ONE   4 TIMES PER DAY    What are your current symptoms: MUCUS PROBLEM     If a prescription is needed, what is your preferred pharmacy and phone number: [facility]     Additional notes:  PATIENTS LUNG DOCTOR IN [place] PREVIOUSLY PRESCRIBED THIS MEDICATION, AND PATIENT IS WANTING  TO TAKE OVER PRESCRIPTION IN ORDER TO KEEP RECEIVING MEDICATION. PLEASE CALL PATIENT AND ADVISE IF THIS CAN BE DONE.

## 2025-06-11 ENCOUNTER — OFFICE VISIT (OUTPATIENT)
Dept: FAMILY MEDICINE CLINIC | Facility: CLINIC | Age: 45
End: 2025-06-11
Payer: MEDICARE

## 2025-06-11 VITALS
HEART RATE: 79 BPM | SYSTOLIC BLOOD PRESSURE: 142 MMHG | HEIGHT: 63 IN | WEIGHT: 179.2 LBS | DIASTOLIC BLOOD PRESSURE: 78 MMHG | BODY MASS INDEX: 31.75 KG/M2 | OXYGEN SATURATION: 98 %

## 2025-06-11 DIAGNOSIS — M25.561 CHRONIC PAIN OF BOTH KNEES: ICD-10-CM

## 2025-06-11 DIAGNOSIS — I10 ESSENTIAL HYPERTENSION: Primary | ICD-10-CM

## 2025-06-11 DIAGNOSIS — K21.9 GASTROESOPHAGEAL REFLUX DISEASE WITHOUT ESOPHAGITIS: ICD-10-CM

## 2025-06-11 DIAGNOSIS — M25.562 CHRONIC PAIN OF BOTH KNEES: ICD-10-CM

## 2025-06-11 DIAGNOSIS — M25.50 POLYARTHRALGIA: ICD-10-CM

## 2025-06-11 DIAGNOSIS — J45.20 MILD INTERMITTENT ASTHMA WITHOUT COMPLICATION: ICD-10-CM

## 2025-06-11 DIAGNOSIS — Z00.00 ANNUAL PHYSICAL EXAM: ICD-10-CM

## 2025-06-11 DIAGNOSIS — R73.03 PREDIABETES: ICD-10-CM

## 2025-06-11 DIAGNOSIS — G89.29 CHRONIC PAIN OF BOTH KNEES: ICD-10-CM

## 2025-06-11 DIAGNOSIS — Z00.00 MEDICARE ANNUAL WELLNESS VISIT, SUBSEQUENT: ICD-10-CM

## 2025-06-11 RX ORDER — IPRATROPIUM BROMIDE AND ALBUTEROL SULFATE 2.5; .5 MG/3ML; MG/3ML
3 SOLUTION RESPIRATORY (INHALATION) EVERY 4 HOURS PRN
Qty: 360 ML | Refills: 3 | Status: SHIPPED | OUTPATIENT
Start: 2025-06-11

## 2025-06-11 NOTE — ASSESSMENT & PLAN NOTE
Stable and controlled with diet and exercise    Orders:    Comprehensive Metabolic Panel; Future    TSH Rfx On Abnormal To Free T4; Future    CBC (No Diff); Future

## 2025-06-11 NOTE — PROGRESS NOTES
Subjective   The ABCs of the Annual Wellness Visit  Medicare Wellness Visit      Mallorie Dover is a 45 y.o. patient who presents for a Medicare Wellness Visit.    The following portions of the patient's history were reviewed and   updated as appropriate: She  has a past medical history of Arthritis, Arthritis of back (2020), Arthritis of neck (2020), Asthma (Teen), Carpal tunnel syndrome, Chronic obstructive pulmonary disease (COPD), GERD (gastroesophageal reflux disease), Hip arthrosis (2020), History of abnormal cervical Pap smear, History of bipolar disorder, History of cervical polypectomy, History of diabetes mellitus, History of hypertension, History of shingles (12/2023), History of thyroid cancer (2015), Hypothyroidism, postsurgical, Low back strain, Neck strain (2000), Obstructive sleep apnea, PCOS (polycystic ovarian syndrome), PTSD (post-traumatic stress disorder), and Spondylosis.  She does not have any pertinent problems on file.  She  has a past surgical history that includes Gastric bypass (07/06/2021); Thyroidectomy, partial (07/28/2015); Breast biopsy (Right, 06/09/2015); Cervical polypectomy (02/05/2021); Bladder suspension; Steroid Injection; and Cervical cone biopsy.  Her family history includes Arthritis in her mother; Breast cancer in her maternal grandmother and mother; COPD in her mother; Colon cancer in her mother; Diabetes in her mother; Heart disease in her mother; Hyperlipidemia in her mother; Hypertension in her mother; Lung cancer in her mother; Mental illness in her mother; Migraines in her mother; Osteoporosis in her mother; Ovarian cancer in her maternal grandmother and mother; Tuberculosis in her mother.  She  reports that she quit smoking about 4 years ago. Her smoking use included cigarettes and cigars. She started smoking about 40 years ago. She has a 45 pack-year smoking history. She has been exposed to tobacco smoke. She has never used smokeless tobacco. She reports  that she does not drink alcohol and does not use drugs.  Current Outpatient Medications   Medication Sig Dispense Refill    Advair Diskus 250-50 MCG/ACT DISKUS Inhale 1 puff 2 (Two) Times a Day. 1 each 9    albuterol sulfate  (90 Base) MCG/ACT inhaler Inhale 2 puffs As Needed for Wheezing. 18 g 5    azelastine (ASTELIN) 0.1 % nasal spray Administer 2 sprays into the nostril(s) as directed by provider 2 (Two) Times a Day.      calamine lotion Apply  topically to the appropriate area as directed 3 (Three) Times a Day As Needed for Itching (itching). 180 mL 0    cetirizine (zyrTEC) 10 MG tablet Take 1 tablet by mouth Daily. 90 tablet 0    ferrous sulfate 325 (65 FE) MG tablet Take 1 tablet by mouth Daily With Breakfast. 90 tablet 1    fluticasone (FLONASE) 50 MCG/ACT nasal spray 2 sprays by Each Nare route Daily. Shake well before using. 48 g 2    ibuprofen (ADVIL,MOTRIN) 800 MG tablet Take 1 tablet by mouth Every 8 (Eight) Hours As Needed for Mild Pain. 270 tablet 2    ipratropium-albuterol (DUO-NEB) 0.5-2.5 mg/3 ml nebulizer Take 3 mL by nebulization Every 4 (Four) Hours As Needed for Wheezing. 360 mL 3    omeprazole (priLOSEC) 40 MG capsule Take 1 capsule by mouth Daily.      ondansetron ODT (ZOFRAN-ODT) 4 MG disintegrating tablet Place 1 tablet on the tongue Every 8 (Eight) Hours As Needed for Nausea or Vomiting. 20 tablet 1    pantoprazole (PROTONIX) 40 MG EC tablet Take 1 tablet by mouth Daily. 90 tablet 1    tiZANidine (ZANAFLEX) 2 MG tablet TAKE 1 TABLET BY MOUTH EVERY 12 HOURS AS NEEDED FOR MUSCLE SPASMS 180 tablet 0    Diclofenac Sodium (VOLTAREN) 1 % gel gel Apply 4 g topically to the appropriate area as directed 4 (Four) Times a Day. 350 g 4     No current facility-administered medications for this visit.     She is allergic to bupropion and seroquel [quetiapine]..    Compared to one year ago, the patient's physical   health is the same.  Compared to one year ago, the patient's mental   health is the  same.    Recent Hospitalizations:  She was not admitted to the hospital during the last year.     Current Medical Providers:  Patient Care Team:  Doug Worrell MD as PCP - General (Internal Medicine)  Pratik Edward MD as Consulting Physician (Pulmonary Disease)  Doug Byrnes MD as Consulting Physician (Orthopedic Surgery)  Tom Hubbard MD as Consulting Physician (Obstetrics and Gynecology)    Outpatient Medications Prior to Visit   Medication Sig Dispense Refill    Advair Diskus 250-50 MCG/ACT DISKUS Inhale 1 puff 2 (Two) Times a Day. 1 each 9    albuterol sulfate  (90 Base) MCG/ACT inhaler Inhale 2 puffs As Needed for Wheezing. 18 g 5    azelastine (ASTELIN) 0.1 % nasal spray Administer 2 sprays into the nostril(s) as directed by provider 2 (Two) Times a Day.      calamine lotion Apply  topically to the appropriate area as directed 3 (Three) Times a Day As Needed for Itching (itching). 180 mL 0    cetirizine (zyrTEC) 10 MG tablet Take 1 tablet by mouth Daily. 90 tablet 0    ferrous sulfate 325 (65 FE) MG tablet Take 1 tablet by mouth Daily With Breakfast. 90 tablet 1    fluticasone (FLONASE) 50 MCG/ACT nasal spray 2 sprays by Each Nare route Daily. Shake well before using. 48 g 2    ibuprofen (ADVIL,MOTRIN) 800 MG tablet Take 1 tablet by mouth Every 8 (Eight) Hours As Needed for Mild Pain. 270 tablet 2    omeprazole (priLOSEC) 40 MG capsule Take 1 capsule by mouth Daily.      ondansetron ODT (ZOFRAN-ODT) 4 MG disintegrating tablet Place 1 tablet on the tongue Every 8 (Eight) Hours As Needed for Nausea or Vomiting. 20 tablet 1    pantoprazole (PROTONIX) 40 MG EC tablet Take 1 tablet by mouth Daily. 90 tablet 1    tiZANidine (ZANAFLEX) 2 MG tablet TAKE 1 TABLET BY MOUTH EVERY 12 HOURS AS NEEDED FOR MUSCLE SPASMS 180 tablet 0    ipratropium-albuterol (DUO-NEB) 0.5-2.5 mg/3 ml nebulizer Take 3 mL by nebulization Every 4 (Four) Hours As Needed for Wheezing. 360 mL 3     No  "facility-administered medications prior to visit.     No opioid medication identified on active medication list. I have reviewed chart for other potential  high risk medication/s and harmful drug interactions in the elderly.      Aspirin is not on active medication list.  Aspirin use is not indicated based on review of current medical condition/s. Risk of harm outweighs potential benefits.  .    Patient Active Problem List   Diagnosis    Abnormal finding on mammography    Polycystic ovaries    Dyspnea    Medicare annual wellness visit, subsequent    Fatigue    Heartburn    History of back pain    History of depression    Hyperlipidemia    Essential hypertension    Insomnia    Mixed papillary and follicular carcinoma    Neuropathy    Posttraumatic stress disorder    Goiter    Neoplasm of thyroid    Thyroid nodule    Tobacco dependence syndrome    Vitamin D deficiency    Bilateral carpal tunnel syndrome    Class 1 obesity due to excess calories with serious comorbidity and body mass index (BMI) of 32.0 to 32.9 in adult    Mild intermittent asthma without complication    Anxiety    Arthritis    Asthma    Carpal tunnel syndrome    Bipolar disorder    Herpes zoster without complication    COPD (chronic obstructive pulmonary disease) with emphysema    Gastroesophageal reflux disease without esophagitis    Claudication    Facet syndrome    Follicular adenoma of thyroid gland    Micropapillary carcinoma    Acute bronchitis    Acute pain of left shoulder    MYNOR on CPAP    Breast lump on right side at 3 o'clock position    Female hirsutism    Acute vaginitis    Annual physical exam     Advance Care Planning Advance Directive is on file.  ACP discussion was held with the patient during this visit. Patient has an advance directive in EMR which is still valid.         Objective   Vitals:    06/11/25 1456   BP: 142/78   Pulse: 79   SpO2: 98%   Weight: 81.3 kg (179 lb 3.2 oz)   Height: 160 cm (62.99\")       Estimated body mass " "index is 31.75 kg/m² as calculated from the following:    Height as of this encounter: 160 cm (62.99\").    Weight as of this encounter: 81.3 kg (179 lb 3.2 oz).         Does the patient have evidence of cognitive impairment? No                                                                                             Health  Risk Assessment    Smoking Status:  Social History     Tobacco Use   Smoking Status Former    Current packs/day: 0.00    Average packs/day: 1.2 packs/day for 38.8 years (45.0 ttl pk-yrs)    Types: Cigarettes, Cigars    Start date: 1985    Quit date: 2020    Years since quittin.7    Passive exposure: Past   Smokeless Tobacco Never     Alcohol Consumption:  Social History     Substance and Sexual Activity   Alcohol Use Never       Fall Risk Screen  STEADI Fall Risk Assessment was completed, and patient is at LOW risk for falls.Assessment completed on:2025    Depression Screening   Little interest or pleasure in doing things? Not at all   Feeling down, depressed, or hopeless? Not at all   PHQ-2 Total Score 0      Health Habits and Functional and Cognitive Screenin/11/2025     3:06 PM   Functional & Cognitive Status   Do you have difficulty preparing food and eating? No   Do you have difficulty bathing yourself, getting dressed or grooming yourself? No   Do you have difficulty using the toilet? No   Do you have difficulty moving around from place to place? No   Do you have trouble with steps or getting out of a bed or a chair? No   Current Diet Well Balanced Diet   Dental Exam Not up to date   Eye Exam Not up to date   Exercise (times per week) 7 times per week   Current Exercises Include Walking   Do you need help using the phone?  No   Are you deaf or do you have serious difficulty hearing?  No   Do you need help to go to places out of walking distance? Yes   Do you need help shopping? No   Do you need help preparing meals?  No   Do you need help with housework?  No   Do " you need help with laundry? No   Do you need help taking your medications? No   Do you need help managing money? No   Do you ever drive or ride in a car without wearing a seat belt? No   Have you felt unusual stress, anger or loneliness in the last month? No   Who do you live with? Spouse   If you need help, do you have trouble finding someone available to you? No   Have you been bothered in the last four weeks by sexual problems? No   Do you have difficulty concentrating, remembering or making decisions? No           Age-appropriate Screening Schedule:  Refer to the list below for future screening recommendations based on patient's age, sex and/or medical conditions. Orders for these recommended tests are listed in the plan section. The patient has been provided with a written plan.    Health Maintenance List  Health Maintenance   Topic Date Due    Hepatitis B (1 of 3 - 19+ 3-dose series) Never done    Pneumococcal Vaccine 0-49 (2 of 2 - PCV) 08/17/2021    TDAP/TD VACCINES (2 - Td or Tdap) 06/09/2024    LIPID PANEL  11/20/2024    HEMOGLOBIN A1C  08/10/2025    INFLUENZA VACCINE  07/01/2025    URINE MICROALBUMIN-CREATININE RATIO (uACR)  02/10/2026    MOST FORM  05/21/2026    ANNUAL WELLNESS VISIT  06/11/2026    MAMMOGRAM  06/03/2027    PAP SMEAR  05/21/2028    COLORECTAL CANCER SCREENING  08/28/2034    HEPATITIS C SCREENING  Completed    COVID-19 Vaccine  Completed    DIABETIC FOOT EXAM  Discontinued    DIABETIC EYE EXAM  Discontinued                                                                                                                                                CMS Preventative Services Quick Reference  Risk Factors Identified During Encounter  Chronic Pain: knee sleeves have been recommended   Immunizations Discussed/Encouraged: Tdap and Prevnar 20 (Pneumococcal 20-valent conjugate)  Dental Screening Recommended  Vision Screening Recommended    The above risks/problems have been discussed with the  patient.  Pertinent information has been shared with the patient in the After Visit Summary.  An After Visit Summary and PPPS were made available to the patient.    Follow Up:   Next Medicare Wellness visit to be scheduled in 1 year.         Additional E&M Note during same encounter follows:  Patient has additional, significant, and separately identifiable condition(s)/problem(s) that require work above and beyond the Medicare Wellness Visit     Chief Complaint  Medicare Wellness-subsequent    Subjective   HPI  Mallorie is also being seen today for multiple chronic issues.  She was previously was treated for HTN and DM prior to gastric bypass. Off mediations x 4 years. Previously on metformin and jardiance. Off now x 3 years  2 years.   Previously treated for thyroid cancer ( off thyroid meds x 2 years).  Local endocrinology, no need for further follow-up.. He is on stable dose of Advair and albuterol for COPD: She relatively rarely uses albuterol.  Typically uses nebulizers at home..     Treated for ptsd and on disability- on no medications- no psychiatrist.   Recently has seen gyneocology, mamogram has been completed.   Routine colonoscopy in 2024 with a 10-year recall.  No biopsies taken, was clear mother had colon cancer.  Denies any abdominal pain blood in her stool or change in her bowel habits.  This time she was having some difficulty eating.  Underwent EGD as well that showed some mild erythema in the gastric remnant.  Anastomosis was fine. Conitues  Protonix 40 mg daily for reflux and she takes a fair amount of.    ibuprofen 800 mg every 8 hours secondary to multiple orthopedic complaints.  He is on chronic iron therapy and will need to remain so secondary to gastric bypass surgery.      Bilateral knee pain, instability and stiffens.She has swelling in the past. Has never had injections. Ibuprofen helps a little.  Also right hip pain has a sharp worse with weight bearing.     She feels she may have arterial  "blockages. She reports LE heaviness was fatigue with walking 100-200 feet. Was told she has an 85% blockage in left.  She does not take aspirin.  She indicates she previously smoked however quit in 2020.    Does continue on CPAP for sleep apnea.    Was seen by ortho Josr zhou- recommend knee braces on both knees- he also recommended rheumatology referral for her polyarthralgias.  She continues t wear her hand MD has been wearing splints. Slow improvement  Review of Systems   Constitutional: Negative.    HENT: Negative.     Respiratory: Negative.     Cardiovascular: Negative.    Endocrine: Negative.    Genitourinary: Negative.    Musculoskeletal:  Positive for arthralgias.   Neurological: Negative.    Hematological: Negative.               Objective   Vital Signs:  /78   Pulse 79   Ht 160 cm (62.99\")   Wt 81.3 kg (179 lb 3.2 oz)   SpO2 98%   BMI 31.75 kg/m²   Physical Exam  Vitals reviewed.   Constitutional:       Appearance: Normal appearance. She is well-developed.   HENT:      Head: Normocephalic and atraumatic.   Eyes:      General:         Right eye: No discharge.         Left eye: No discharge.      Conjunctiva/sclera: Conjunctivae normal.   Cardiovascular:      Rate and Rhythm: Normal rate and regular rhythm.      Pulses: Normal pulses.   Pulmonary:      Effort: Pulmonary effort is normal.      Breath sounds: No wheezing or rales.   Chest:      Chest wall: No tenderness.   Abdominal:      General: Bowel sounds are normal.      Palpations: Abdomen is soft.   Genitourinary:     Comments: No CVA tendernesss   Musculoskeletal:      Comments: No m/l joint line ttp. No effsuin- assym patellar tracking   Skin:     General: Skin is warm and dry.   Neurological:      Mental Status: She is alert and oriented to person, place, and time.      Comments: Tug < 10   Psychiatric:         Mood and Affect: Mood normal.         Behavior: Behavior normal.                    Assessment and Plan      Annual physical " exam    Orders:    Comprehensive Metabolic Panel; Future    Lipid Panel; Future    CBC (No Diff); Future    Essential hypertension  Stable and controlled with diet and exercise    Orders:    Comprehensive Metabolic Panel; Future    TSH Rfx On Abnormal To Free T4; Future    CBC (No Diff); Future    Gastroesophageal reflux disease without esophagitis  Improved with daily protonix       Mild intermittent asthma without complication  Stable on  maintneance meds with rare non recquiriement               Medicare annual wellness visit, subsequent         Prediabetes    Orders:    Hemoglobin A1c; Future    Chronic pain of both knees    Orders:     Garment, Belt, Sleeve or Other Covering, Elastic, Any Type    Ambulatory Referral to Rheumatology    Polyarthralgia    Orders:    Ambulatory Referral to Rheumatology            Follow Up   Return in about 1 year (around 6/11/2026) for Medicare Wellness.  Patient was given instructions and counseling regarding her condition or for health maintenance advice. Please see specific information pulled into the AVS if appropriate.

## 2025-06-11 NOTE — ASSESSMENT & PLAN NOTE
Orders:    Comprehensive Metabolic Panel; Future    Lipid Panel; Future    CBC (No Diff); Future

## 2025-06-12 ENCOUNTER — TELEPHONE (OUTPATIENT)
Dept: FAMILY MEDICINE CLINIC | Facility: CLINIC | Age: 45
End: 2025-06-12

## 2025-06-12 NOTE — TELEPHONE ENCOUNTER
Caller: Mallorie Dover    Relationship: Self    Best call back number:    [phone] (Mobile)     What form or medical record are you requesting:     PATIENT REQUESTED (2) COPIES OF CAREGIVER LETTER COMPLETED BY DR WHITAKER YESTERDAY, 6/11    PATIENT REQUESTED THE LETTERS BE MAILED TO HER HOME AT:    [address] [address]

## 2025-06-13 RX ORDER — OMEPRAZOLE 40 MG/1
40 CAPSULE, DELAYED RELEASE ORAL DAILY
Qty: 90 CAPSULE | Refills: 0 | Status: SHIPPED | OUTPATIENT
Start: 2025-06-13

## 2025-07-01 ENCOUNTER — LAB (OUTPATIENT)
Dept: LAB | Facility: HOSPITAL | Age: 45
End: 2025-07-01
Payer: MEDICARE

## 2025-07-01 ENCOUNTER — TELEPHONE (OUTPATIENT)
Dept: FAMILY MEDICINE CLINIC | Facility: CLINIC | Age: 45
End: 2025-07-01
Payer: MEDICARE

## 2025-07-01 DIAGNOSIS — I10 ESSENTIAL HYPERTENSION: ICD-10-CM

## 2025-07-01 DIAGNOSIS — R30.0 BURNING WITH URINATION: ICD-10-CM

## 2025-07-01 DIAGNOSIS — Z00.00 ANNUAL PHYSICAL EXAM: ICD-10-CM

## 2025-07-01 DIAGNOSIS — E61.1 IRON DEFICIENCY: Primary | ICD-10-CM

## 2025-07-01 DIAGNOSIS — Z98.84 GASTRIC BYPASS STATUS FOR OBESITY: ICD-10-CM

## 2025-07-01 DIAGNOSIS — R73.03 PREDIABETES: ICD-10-CM

## 2025-07-01 LAB
ALBUMIN SERPL-MCNC: 4.1 G/DL (ref 3.5–5.2)
ALBUMIN/GLOB SERPL: 1.3 G/DL
ALP SERPL-CCNC: 99 U/L (ref 39–117)
ALT SERPL W P-5'-P-CCNC: 17 U/L (ref 1–33)
ANION GAP SERPL CALCULATED.3IONS-SCNC: 12 MMOL/L (ref 5–15)
AST SERPL-CCNC: 22 U/L (ref 1–32)
BACTERIA UR QL AUTO: ABNORMAL /HPF
BILIRUB SERPL-MCNC: 0.3 MG/DL (ref 0–1.2)
BILIRUB UR QL STRIP: NEGATIVE
BUN SERPL-MCNC: 10 MG/DL (ref 6–20)
BUN/CREAT SERPL: 17.2 (ref 7–25)
CALCIUM SPEC-SCNC: 9 MG/DL (ref 8.6–10.5)
CHLORIDE SERPL-SCNC: 102 MMOL/L (ref 98–107)
CHOLEST SERPL-MCNC: 185 MG/DL (ref 0–200)
CLARITY UR: CLEAR
CO2 SERPL-SCNC: 22 MMOL/L (ref 22–29)
COLOR UR: YELLOW
CREAT SERPL-MCNC: 0.58 MG/DL (ref 0.57–1)
DEPRECATED RDW RBC AUTO: 41.1 FL (ref 37–54)
EGFRCR SERPLBLD CKD-EPI 2021: 112.5 ML/MIN/1.73
ERYTHROCYTE [DISTWIDTH] IN BLOOD BY AUTOMATED COUNT: 15.7 % (ref 12.3–15.4)
GLOBULIN UR ELPH-MCNC: 3.1 GM/DL
GLUCOSE SERPL-MCNC: 95 MG/DL (ref 65–99)
GLUCOSE UR STRIP-MCNC: NEGATIVE MG/DL
HBA1C MFR BLD: 5.9 % (ref 4.8–5.6)
HCT VFR BLD AUTO: 37.3 % (ref 34–46.6)
HDLC SERPL-MCNC: 57 MG/DL (ref 40–60)
HGB BLD-MCNC: 11.7 G/DL (ref 12–15.9)
HGB UR QL STRIP.AUTO: NEGATIVE
HYALINE CASTS UR QL AUTO: ABNORMAL /LPF
IRON 24H UR-MRATE: 35 MCG/DL (ref 37–145)
IRON SATN MFR SERPL: 7 % (ref 20–50)
KETONES UR QL STRIP: NEGATIVE
LDLC SERPL CALC-MCNC: 113 MG/DL (ref 0–100)
LDLC/HDLC SERPL: 1.95 {RATIO}
LEUKOCYTE ESTERASE UR QL STRIP.AUTO: NEGATIVE
MCH RBC QN AUTO: 23 PG (ref 26.6–33)
MCHC RBC AUTO-ENTMCNC: 31.4 G/DL (ref 31.5–35.7)
MCV RBC AUTO: 73.3 FL (ref 79–97)
NITRITE UR QL STRIP: NEGATIVE
PH UR STRIP.AUTO: 7 [PH] (ref 5–8)
PLATELET # BLD AUTO: 316 10*3/MM3 (ref 140–450)
PMV BLD AUTO: 10.5 FL (ref 6–12)
POTASSIUM SERPL-SCNC: 3.9 MMOL/L (ref 3.5–5.2)
PROT SERPL-MCNC: 7.2 G/DL (ref 6–8.5)
PROT UR QL STRIP: NEGATIVE
RBC # BLD AUTO: 5.09 10*6/MM3 (ref 3.77–5.28)
RBC # UR STRIP: ABNORMAL /HPF
REF LAB TEST METHOD: ABNORMAL
SODIUM SERPL-SCNC: 136 MMOL/L (ref 136–145)
SP GR UR STRIP: 1.01 (ref 1–1.03)
SQUAMOUS #/AREA URNS HPF: ABNORMAL /HPF
TIBC SERPL-MCNC: 474 MCG/DL (ref 298–536)
TRANSFERRIN SERPL-MCNC: 318 MG/DL (ref 200–360)
TRIGL SERPL-MCNC: 83 MG/DL (ref 0–150)
TSH SERPL DL<=0.05 MIU/L-ACNC: 1.25 UIU/ML (ref 0.27–4.2)
UROBILINOGEN UR QL STRIP: NORMAL
VLDLC SERPL-MCNC: 15 MG/DL (ref 5–40)
WBC # UR STRIP: ABNORMAL /HPF
WBC NRBC COR # BLD AUTO: 6.11 10*3/MM3 (ref 3.4–10.8)

## 2025-07-01 PROCEDURE — 83036 HEMOGLOBIN GLYCOSYLATED A1C: CPT

## 2025-07-01 PROCEDURE — 80053 COMPREHEN METABOLIC PANEL: CPT

## 2025-07-01 PROCEDURE — 83540 ASSAY OF IRON: CPT

## 2025-07-01 PROCEDURE — 84466 ASSAY OF TRANSFERRIN: CPT

## 2025-07-01 PROCEDURE — 84443 ASSAY THYROID STIM HORMONE: CPT

## 2025-07-01 PROCEDURE — 81001 URINALYSIS AUTO W/SCOPE: CPT

## 2025-07-01 PROCEDURE — 80061 LIPID PANEL: CPT

## 2025-07-01 PROCEDURE — 87086 URINE CULTURE/COLONY COUNT: CPT

## 2025-07-01 PROCEDURE — 85027 COMPLETE CBC AUTOMATED: CPT

## 2025-07-01 NOTE — TELEPHONE ENCOUNTER
The patient Mallorie called wanting to speak with Torri, which is Dr. Doug Worrell Nurse regarding her test results from today at the lab. I explained that lab results can take up to a week to come back, but sometimes they are available within 48 hours, depending on when they are read by a specialist. She also mentioned that at her last appointment, she was informed by Dr. Benigno Worrell that she would be getting knee braces, but she hasn't received an update yet. She wants to talk to Dr. Worrell or Torri about this. I was unable to contact Dr. Worrell Nurse Torri, so I am routing the message the Nurse Torri so she can call the patient back regarding her concerns about the knee braces and what needs to happen further!       Best call back number for patient is [phone]

## 2025-07-03 ENCOUNTER — RESULTS FOLLOW-UP (OUTPATIENT)
Dept: LAB | Facility: HOSPITAL | Age: 45
End: 2025-07-03
Payer: MEDICARE

## 2025-07-03 LAB — BACTERIA SPEC AEROBE CULT: NO GROWTH

## 2025-07-14 ENCOUNTER — TELEPHONE (OUTPATIENT)
Dept: FAMILY MEDICINE CLINIC | Facility: CLINIC | Age: 45
End: 2025-07-14
Payer: MEDICARE

## 2025-07-14 ENCOUNTER — PROCEDURE VISIT (OUTPATIENT)
Dept: NEUROLOGY | Facility: CLINIC | Age: 45
End: 2025-07-14
Payer: MEDICARE

## 2025-07-14 DIAGNOSIS — G56.03 CARPAL TUNNEL SYNDROME, BILATERAL: ICD-10-CM

## 2025-07-14 PROCEDURE — 95886 MUSC TEST DONE W/N TEST COMP: CPT | Performed by: PSYCHIATRY & NEUROLOGY

## 2025-07-14 PROCEDURE — 95911 NRV CNDJ TEST 9-10 STUDIES: CPT | Performed by: PSYCHIATRY & NEUROLOGY

## 2025-07-14 NOTE — TELEPHONE ENCOUNTER
Caller: Mallorie Dover    Relationship: Self    Best call back number: [phone]     What is the best time to reach you: ANY    Who are you requesting to speak with (clinical staff, provider,  specific staff member): NURSE    Do you know the name of the person who called: PATIENT    What was the call regarding: BLOOD SUGAR LEVELS HAVE BEEN DROPPING TO 50'S. WHEN DROPS, WILL SHAKE.  EATS AN ORANGE.  USUALLY NO PROBLEMS WITH BLOOD SUGAR.  WHAT TO DO?    Is it okay if the provider responds through MyChart: PHONE CALL PLEASE

## 2025-07-14 NOTE — PROGRESS NOTES
[facility]   Electrodiagnostic Laboratory    Nerve Conduction & EMG Report        Patient: Mallorie Dover   Patient ID: [id number]   YOB: 1980  Sex: female      Exam Physician:  Riccardo Eaton MD  Refer Physician:  Doug Byrnes MD    Electromyogram and Nerve Conduction Velocity Procedure Note    Hx: 45 y.o. right handed female with complaint of pain involving the the upper extremities. Symptoms have been present for several months and were provoked by worse at night. Significant past medical history includes nothing suggestive of neuropathy.  Family history no family history of nerve or muscle disease.    Exam: Motor power is normal. There is no atrophy. There are no fasciculations. Deep tendon reflexes are present and symmetrical. Sensory exam is normal.      Edx studies of the B UE were performed to evaluate for peripheral nerve entrapment.     NCS Examination   For sensory nerve conduction studies, the amplitude is measured peak-to-peak, the latency reported is the distal peak latency, and the conduction velocity, if measured, is determined from onset latencies and is over the forearm.   For motor nerve conduction studies, the amplitude is measured baseline-to-peak, the latency reported is the distal onset latency, the conduction velocity is calculated over the forearm, and the F wave latency is the minimum latency.   Unless otherwise noted, the hand temperature was monitored continuously and remained between 32°C and 36°C during the performance of the NCSs.          Nerve Conduction Studies  Anti Sensory Summary Table     Stim Site NR Norm Peak (ms) O-P Amp (µV) Norm O-P Amp Onset (ms) Site1 Site2 Delta-0 (ms) Dist (cm) Vladislav (m/s) Norm Vladislav (m/s)   Left Median Anti Sensory (2nd Digit)   Wrist    <3.6 60.7 >10 2.3 Wrist 2nd Digit 2.3 14.0 61    Right Median Anti Sensory (2nd Digit)   Wrist    <3.6 42.7 >10 2.7 Wrist 2nd Digit 2.7 14.0 52    Left Radial Anti  Sensory (Base 1st Digit)   Wrist    <3.1 29.2  1.3 Wrist Base 1st Digit 1.3 0.0     Right Radial Anti Sensory (Base 1st Digit)   Wrist    <3.1 31.1  0.9 Wrist Base 1st Digit 0.9 0.0     Left Ulnar Anti Sensory (5th Digit)   Wrist    <3.7 54.9 >15.0 1.6 Wrist 5th Digit 1.6 0.0     Right Ulnar Anti Sensory (5th Digit)   Wrist    <3.7 40.6 >15.0 1.5 Wrist 5th Digit 1.5 0.0       Motor Summary Table     Stim Site NR Onset (ms) Norm Onset (ms) O-P Amp (mV) Norm O-P Amp Site1 Site2 Delta-0 (ms) Dist (cm) Vladislav (m/s) Norm Vladislav (m/s)   Left Median Motor (Abd Poll Brev)   Wrist    3.5 <4.2 8.9 >5 Elbow Wrist 4.0 20.0 50 >50   Elbow    7.5  3.7          Right Median Motor (Abd Poll Brev)   Wrist    3.0 <4.2 7.7 >5 Elbow Wrist 3.9 20.0 51 >50   Elbow    6.9  3.8          Left Ulnar Motor (Abd Dig Minimi)   Wrist    2.1 <4.2 7.2 >3 B Elbow Wrist 3.6 19.0 53 >53   B Elbow    5.7  3.8  A Elbow B Elbow 1.3 9.0 69 >53   A Elbow    7.0  6.5          Right Ulnar Motor (Abd Dig Minimi)   Wrist    2.1 <4.2 5.5 >3 B Elbow Wrist 3.4 19.0 56 >53   B Elbow    5.5  5.5  A Elbow B Elbow 0.5 9.0 59 >53   A Elbow    6.0  5.8            F Wave Studies     NR F-Lat (ms) Lat Norm (ms) L-R F-Lat (ms) L-R Lat Norm   Left Median (Mrkrs) (Abd Poll Brev)      25.48 <33 0.64 <2.2   Right Median (Mrkrs) (Abd Poll Brev)      24.84 <33 0.64 <2.2   Left Ulnar (Mrkrs) (Abd Dig Min)      25.70 <36 0.94 <2.5   Right Ulnar (Mrkrs) (Abd Dig Min)      24.77 <36 0.94 <2.5     EMG Examination   The study was performed with a concentric needle electrode. Fibrillation and fasciculation activity is graded from none (0) to continuous (4+). The configuration and recruitment pattern of motor unit action potentials under voluntary control, if not normal, are described below       Side Muscle Nerve Root Ins Act Fibs Psw Amp Dur Poly Recrt Int Pat Comment   Right Deltoid Axillary C5-6 Nml Nml Nml Nml Nml 0 Nml Nml    Right Triceps Radial C6-7-8 Nml Nml Nml Nml Nml 0 Nml Nml     Right Biceps Musculocut C5-6 Nml Nml Nml Nml Nml 0 Nml Nml    Right PronatorTeres Median C6-7 Nml Nml Nml Nml Nml 0 Nml Nml    Right 1stDorInt Ulnar C8-T1 Nml Nml Nml Nml Nml 0 Nml Nml    Right Abd Poll Brev Median C8-T1 Nml Nml Nml Nml Nml 0 Nml Nml    Left Deltoid Axillary C5-6 Nml Nml Nml Nml Nml 0 Nml Nml    Left Triceps Radial C6-7-8 Nml Nml Nml Nml Nml 0 Nml Nml    Left Biceps Musculocut C5-6 Nml Nml Nml Nml Nml 0 Nml Nml    Left PronatorTeres Median C6-7 Nml Nml Nml Nml Nml 0 Nml Nml    Left 1stDorInt Ulnar C8-T1 Nml Nml Nml Nml Nml 0 Nml Nml    Left Abd Poll Brev Median C8-T1 Nml Nml Nml Nml Nml 0 Nml Nml        NCV FINDINGS:  All nerve conduction studies (as indicated in the following tables) were within normal limits.  All F Wave latencies were within normal limits.      EMG FINDINGS:  All examined muscles (as indicated in the following table) showed no evidence of electrical instability.    Conclusion: Normal NCV and EMG of the upper extremities          Instrument used:  Teca Synergy        Performed by:          Riccardo Eaton MD

## 2025-07-15 NOTE — TELEPHONE ENCOUNTER
Per Dr. Worrell As long as she is feeling fine (no shakiness, nausea, clamminess), that gets better with eating. PT understood no further questions

## 2025-07-28 ENCOUNTER — OFFICE VISIT (OUTPATIENT)
Age: 45
End: 2025-07-28
Payer: MEDICARE

## 2025-07-28 VITALS
BODY MASS INDEX: 32.12 KG/M2 | WEIGHT: 181.3 LBS | SYSTOLIC BLOOD PRESSURE: 134 MMHG | HEIGHT: 63 IN | DIASTOLIC BLOOD PRESSURE: 60 MMHG

## 2025-07-28 DIAGNOSIS — M65.4 DE QUERVAIN'S TENOSYNOVITIS: Primary | ICD-10-CM

## 2025-07-28 PROCEDURE — 1159F MED LIST DOCD IN RCRD: CPT | Performed by: PLASTIC SURGERY

## 2025-07-28 PROCEDURE — 1160F RVW MEDS BY RX/DR IN RCRD: CPT | Performed by: PLASTIC SURGERY

## 2025-07-28 PROCEDURE — 99213 OFFICE O/P EST LOW 20 MIN: CPT | Performed by: PLASTIC SURGERY

## 2025-07-28 PROCEDURE — 3075F SYST BP GE 130 - 139MM HG: CPT | Performed by: PLASTIC SURGERY

## 2025-07-28 PROCEDURE — 3078F DIAST BP <80 MM HG: CPT | Performed by: PLASTIC SURGERY

## 2025-07-28 PROCEDURE — 20550 NJX 1 TENDON SHEATH/LIGAMENT: CPT | Performed by: PLASTIC SURGERY

## 2025-07-28 RX ORDER — DEXAMETHASONE SODIUM PHOSPHATE 4 MG/ML
2 INJECTION, SOLUTION INTRA-ARTICULAR; INTRALESIONAL; INTRAMUSCULAR; INTRAVENOUS; SOFT TISSUE
Status: COMPLETED | OUTPATIENT
Start: 2025-07-28 | End: 2025-07-28

## 2025-07-28 RX ORDER — CETIRIZINE HYDROCHLORIDE 10 MG/1
10 TABLET ORAL DAILY
Qty: 90 TABLET | Refills: 0 | Status: SHIPPED | OUTPATIENT
Start: 2025-07-28

## 2025-07-28 RX ORDER — LIDOCAINE HYDROCHLORIDE 10 MG/ML
0.5 INJECTION, SOLUTION EPIDURAL; INFILTRATION; INTRACAUDAL; PERINEURAL
Status: COMPLETED | OUTPATIENT
Start: 2025-07-28 | End: 2025-07-28

## 2025-07-28 RX ADMIN — LIDOCAINE HYDROCHLORIDE 0.5 ML: 10 INJECTION, SOLUTION EPIDURAL; INFILTRATION; INTRACAUDAL; PERINEURAL at 14:29

## 2025-07-28 RX ADMIN — DEXAMETHASONE SODIUM PHOSPHATE 2 MG: 4 INJECTION, SOLUTION INTRA-ARTICULAR; INTRALESIONAL; INTRAMUSCULAR; INTRAVENOUS; SOFT TISSUE at 14:29

## 2025-07-28 NOTE — PROGRESS NOTES
Procedure   - Hand/Upper Extremity Injection: L extensor compartment 1 for de Quervain's tenosynovitis on 7/28/2025 2:29 PM  Indications: pain  Details: 27 G needle, radial approach  Medications: 0.5 mL lidocaine PF 1% 1 %; 2 mg dexAMETHasone 4 MG/ML  Outcome: tolerated well, no immediate complications  Procedure, treatment alternatives, risks and benefits explained, specific risks discussed. Consent was given by the patient. Immediately prior to procedure a time out was called to verify the correct patient, procedure, equipment, support staff and site/side marked as required. Patient was prepped and draped in the usual sterile fashion.

## 2025-07-28 NOTE — PROGRESS NOTES
[facility]     Follow-up Office Visit       Date: 07/28/2025   Patient Name: Mallorie Dover  MRN: [id number]  YOB: 1980    Chief Complaint:   Chief Complaint   Patient presents with    Follow-up     6 month f/u-- Carpal tunnel syndrome, bilateral - De Quervain's tenosynovitis       History of Present Illness:   Mallorie Dover is a 45 y.o. female presents for follow-up of bilateral de Quervain's tenosynovitis.  Patient underwent bilateral de Quervain's injection at her last visit with approximately 2 months of relief.  She reports symptoms have worsened over the last 3 months.  EMG nerve conduction studies demonstrated no evidence of carpal tunnel syndrome.  She reports ongoing radial sided wrist and hand pain.  Reports pain is a 9 out of 10 at its worst.  Reports it is primarily on her left side down.      Subjective   Review of Systems:   Review of Systems   Constitutional:  Negative for chills, fever, unexpected weight gain and unexpected weight loss.   HENT:  Negative for congestion, postnasal drip and rhinorrhea.    Eyes:  Negative for blurred vision.   Respiratory:  Negative for shortness of breath.    Cardiovascular:  Negative for leg swelling.   Gastrointestinal:  Negative for abdominal pain, nausea and vomiting.   Genitourinary:  Negative for difficulty urinating.   Musculoskeletal:  Positive for arthralgias. Negative for gait problem, joint swelling and myalgias.   Skin:  Negative for skin lesions and wound.   Neurological:  Negative for dizziness, weakness, light-headedness and numbness.   Hematological:  Does not bruise/bleed easily.   Psychiatric/Behavioral:  Negative for depressed mood.         Pertinent review of systems per HPI    I reviewed the patient's chief complaint, history of present illness, review of systems, past medical history, surgical history, family history, social history,  "medications and allergy list in the EMR on 07/28/2025 and agree with the findings above.    Objective    Vital Signs:   Vitals:    07/28/25 1404   BP: 134/60   Weight: 82.2 kg (181 lb 4.8 oz)   Height: 160 cm (62.99\")     BMI: Body mass index is 32.12 kg/m².     General Appearance: No acute distress. Alert and oriented.     Chest:  Non-labored breathing on room air      Ortho Exam:  Tender palpation of the left radial styloid.  Positive Finkelstein's test.  Mild tenderness to palpation of the CMC.  Mildly positive CMC shuck test.  Negative grind test.  Nontender over the thumb A1 pulley.  Nontender at the scaphoid.  Fingers warm and well-perfused distally  Sensation intact to light touch in the median, radial and ulnar nerve distributions    Imaging/Studies:   Imaging Results (Last 24 Hours)       ** No results found for the last 24 hours. **                Procedures:  Procedures    Quality Measures:   ACP:   ACP discussion was deferred.    Tobacco:   Mallorie Dover  reports that she quit smoking about 4 years ago. Her smoking use included cigarettes and cigars. She started smoking about 40 years ago. She has a 45 pack-year smoking history. She has been exposed to tobacco smoke. She has never used smokeless tobacco.    Assessment / Plan    Assessment/Plan:      Diagnosis Plan   1. De Quervain's tenosynovitis            Patient presents for follow-up of bilateral left greater than right de Quervain's tenosynovitis.  She underwent corticosteroid injection at her last visit with approximately 2 months of relief.  We again reviewed her diagnosis as well as treatment options.  She is primarily having left wrist pain at this time today.  Recommend repeat left de Quervain's injection.  Recommend continued observation of the right wrist.  Recommend follow-up here if symptoms persist or worsen.    Procedure Note: DeQuervain's injection    I discussed with the patient the potential benefits of performing therapeutic " injections as well as potential risks including but not limited to infection, swelling, pain, bleeding, bruising, nerve/vessel damage, skin color changes, transient elevation in blood glucose levels, and fat atrophy. After informed consent and after the areas were prepped with chlorhexadine soap, ethyl chloride was used to numb the skin. Using anatomic landmarks, 2 mg of dexamethasone and 0.5 cc of 1% lidocaine was injected into the first dorsal compartment of the left wrist.  The patient tolerated the procedure well. There were no complications. A sterile dressing was placed over the injection sites.      Follow Up:   Return if symptoms worsen or fail to improve.        Doug Byrnes MD  [facility] [place], [place] Hand and Upper Extremity Surgeon

## 2025-08-05 DIAGNOSIS — K21.9 GASTROESOPHAGEAL REFLUX DISEASE, UNSPECIFIED WHETHER ESOPHAGITIS PRESENT: ICD-10-CM

## 2025-08-05 DIAGNOSIS — R11.2 NAUSEA AND VOMITING, UNSPECIFIED VOMITING TYPE: ICD-10-CM

## 2025-08-05 RX ORDER — PANTOPRAZOLE SODIUM 40 MG/1
40 TABLET, DELAYED RELEASE ORAL DAILY
Qty: 90 TABLET | Refills: 1 | Status: SHIPPED | OUTPATIENT
Start: 2025-08-05 | End: 2025-08-08

## 2025-08-07 ENCOUNTER — TELEPHONE (OUTPATIENT)
Dept: ORTHOPEDIC SURGERY | Facility: CLINIC | Age: 45
End: 2025-08-07
Payer: MEDICARE

## 2025-08-08 ENCOUNTER — OFFICE VISIT (OUTPATIENT)
Age: 45
End: 2025-08-08
Payer: MEDICARE

## 2025-08-08 VITALS
HEIGHT: 63 IN | WEIGHT: 181.22 LBS | BODY MASS INDEX: 32.11 KG/M2 | DIASTOLIC BLOOD PRESSURE: 84 MMHG | SYSTOLIC BLOOD PRESSURE: 130 MMHG

## 2025-08-08 DIAGNOSIS — M17.0 PRIMARY OSTEOARTHRITIS OF KNEES, BILATERAL: ICD-10-CM

## 2025-08-08 DIAGNOSIS — M25.561 PAIN IN BOTH KNEES, UNSPECIFIED CHRONICITY: Primary | ICD-10-CM

## 2025-08-08 DIAGNOSIS — M25.562 PAIN IN BOTH KNEES, UNSPECIFIED CHRONICITY: Primary | ICD-10-CM

## 2025-08-08 RX ADMIN — DEXAMETHASONE SODIUM PHOSPHATE 4 MG: 4 INJECTION, SOLUTION INTRA-ARTICULAR; INTRALESIONAL; INTRAMUSCULAR; INTRAVENOUS; SOFT TISSUE at 11:31

## 2025-08-08 RX ADMIN — LIDOCAINE HYDROCHLORIDE 4 ML: 10 INJECTION, SOLUTION EPIDURAL; INFILTRATION; INTRACAUDAL; PERINEURAL at 11:31

## 2025-08-11 ENCOUNTER — OFFICE VISIT (OUTPATIENT)
Age: 45
End: 2025-08-11
Payer: MEDICARE

## 2025-08-11 VITALS
HEIGHT: 63 IN | DIASTOLIC BLOOD PRESSURE: 60 MMHG | SYSTOLIC BLOOD PRESSURE: 124 MMHG | BODY MASS INDEX: 32.11 KG/M2 | WEIGHT: 181.22 LBS

## 2025-08-11 DIAGNOSIS — M25.572 BILATERAL ANKLE PAIN, UNSPECIFIED CHRONICITY: ICD-10-CM

## 2025-08-11 DIAGNOSIS — M25.571 BILATERAL ANKLE PAIN, UNSPECIFIED CHRONICITY: ICD-10-CM

## 2025-08-11 DIAGNOSIS — M72.2 PLANTAR FASCIITIS, BILATERAL: ICD-10-CM

## 2025-08-11 DIAGNOSIS — M77.42 METATARSALGIA OF BOTH FEET: ICD-10-CM

## 2025-08-11 DIAGNOSIS — M77.41 METATARSALGIA OF BOTH FEET: ICD-10-CM

## 2025-08-11 DIAGNOSIS — M79.672 BILATERAL FOOT PAIN: Primary | ICD-10-CM

## 2025-08-11 DIAGNOSIS — M76.61 RIGHT ACHILLES TENDINITIS: ICD-10-CM

## 2025-08-11 DIAGNOSIS — M79.671 BILATERAL FOOT PAIN: Primary | ICD-10-CM

## 2025-08-11 DIAGNOSIS — M76.62 TENDONITIS, ACHILLES, LEFT: ICD-10-CM

## 2025-08-11 PROCEDURE — 3078F DIAST BP <80 MM HG: CPT | Performed by: PHYSICIAN ASSISTANT

## 2025-08-11 PROCEDURE — 99213 OFFICE O/P EST LOW 20 MIN: CPT | Performed by: PHYSICIAN ASSISTANT

## 2025-08-11 PROCEDURE — 3074F SYST BP LT 130 MM HG: CPT | Performed by: PHYSICIAN ASSISTANT

## 2025-08-11 PROCEDURE — 1159F MED LIST DOCD IN RCRD: CPT | Performed by: PHYSICIAN ASSISTANT

## 2025-08-11 PROCEDURE — 1160F RVW MEDS BY RX/DR IN RCRD: CPT | Performed by: PHYSICIAN ASSISTANT

## 2025-08-11 RX ORDER — DEXAMETHASONE SODIUM PHOSPHATE 4 MG/ML
4 INJECTION, SOLUTION INTRA-ARTICULAR; INTRALESIONAL; INTRAMUSCULAR; INTRAVENOUS; SOFT TISSUE
Status: COMPLETED | OUTPATIENT
Start: 2025-08-08 | End: 2025-08-08

## 2025-08-11 RX ORDER — LIDOCAINE HYDROCHLORIDE 10 MG/ML
4 INJECTION, SOLUTION EPIDURAL; INFILTRATION; INTRACAUDAL; PERINEURAL
Status: COMPLETED | OUTPATIENT
Start: 2025-08-08 | End: 2025-08-08

## 2025-08-14 ENCOUNTER — TELEPHONE (OUTPATIENT)
Dept: ORTHOPEDIC SURGERY | Facility: CLINIC | Age: 45
End: 2025-08-14
Payer: MEDICARE

## 2025-08-28 ENCOUNTER — TELEPHONE (OUTPATIENT)
Dept: FAMILY MEDICINE CLINIC | Facility: CLINIC | Age: 45
End: 2025-08-28
Payer: MEDICARE